# Patient Record
Sex: FEMALE | Race: BLACK OR AFRICAN AMERICAN
[De-identification: names, ages, dates, MRNs, and addresses within clinical notes are randomized per-mention and may not be internally consistent; named-entity substitution may affect disease eponyms.]

---

## 2017-02-11 ENCOUNTER — HOSPITAL ENCOUNTER (EMERGENCY)
Dept: HOSPITAL 17 - NEPE | Age: 42
LOS: 1 days | Discharge: HOME | End: 2017-02-12
Payer: MEDICAID

## 2017-02-11 VITALS
RESPIRATION RATE: 18 BRPM | OXYGEN SATURATION: 98 % | DIASTOLIC BLOOD PRESSURE: 68 MMHG | HEART RATE: 86 BPM | SYSTOLIC BLOOD PRESSURE: 113 MMHG

## 2017-02-11 VITALS
SYSTOLIC BLOOD PRESSURE: 107 MMHG | OXYGEN SATURATION: 99 % | HEART RATE: 87 BPM | TEMPERATURE: 98.4 F | RESPIRATION RATE: 16 BRPM | DIASTOLIC BLOOD PRESSURE: 63 MMHG

## 2017-02-11 VITALS
OXYGEN SATURATION: 97 % | SYSTOLIC BLOOD PRESSURE: 122 MMHG | RESPIRATION RATE: 20 BRPM | DIASTOLIC BLOOD PRESSURE: 74 MMHG | HEART RATE: 84 BPM

## 2017-02-11 VITALS — BODY MASS INDEX: 25.51 KG/M2 | HEIGHT: 66 IN | WEIGHT: 158.73 LBS

## 2017-02-11 DIAGNOSIS — G89.29: ICD-10-CM

## 2017-02-11 DIAGNOSIS — K21.9: ICD-10-CM

## 2017-02-11 DIAGNOSIS — M25.561: ICD-10-CM

## 2017-02-11 DIAGNOSIS — R30.0: Primary | ICD-10-CM

## 2017-02-11 DIAGNOSIS — I10: ICD-10-CM

## 2017-02-11 LAB
COLOR UR: YELLOW
COMMENT (UR): (no result)
CULTURE IF INDICATED: (no result)
GLUCOSE UR STRIP-MCNC: (no result) MG/DL
HGB UR QL STRIP: (no result)
KETONES UR STRIP-MCNC: (no result) MG/DL
MUCOUS THREADS #/AREA URNS LPF: (no result) /LPF
NITRITE UR QL STRIP: (no result)
SP GR UR STRIP: 1.02 (ref 1–1.03)
SQUAMOUS #/AREA URNS HPF: 2 /HPF (ref 0–5)

## 2017-02-11 PROCEDURE — 96372 THER/PROPH/DIAG INJ SC/IM: CPT

## 2017-02-11 PROCEDURE — 81001 URINALYSIS AUTO W/SCOPE: CPT

## 2017-02-11 PROCEDURE — 99284 EMERGENCY DEPT VISIT MOD MDM: CPT

## 2017-02-11 NOTE — PD
HPI


Chief Complaint:   Complaint


Time Seen by Provider:  20:28


Travel History


International Travel<30 days:  No


Contact w/Intl Traveler<30days:  No


Traveled to known affect area:  No





History of Present Illness


HPI


41-year-old female that presents to the ED for evaluation of burning with 

urination.  Per patient she's had dysuria and polyuria for the past couple 

days.  Per patient is getting worse today.  Per patient she's had some back 

pain with it as well.  Per patient she also has some chronic right knee pain 

secondary to having bad arthritis.  Per patient she has bone-on-bone.  Per 

patient nothing seems to make the pain better.  She denies any nausea or 

vomiting.  No chest pain or shortness of breath.  Per patient her pain on the 

urine is 8 out of 10.  She does have allergies to Lortab and tramadol.  Per 

patient she does tell me that she has an appointment with her orthopedic 

specialist in [place] sometime at the end of this month.  She tells me that 

she has no blood in the urine.  She denies any other medical problem at this 

time.  She does have a history of chronic anxiety.  She denies any pregnancy.  

Patient had her uterus and ovaries removed.  The pain does not radiate.  Pain 

on the knee gets worse with movement.  Per patient the pain in the knee is 6 

out of 10 but comes and goes. Denies any discharge.





PFSH


Past Medical History


ADHD:  Yes


Anxiety:  Yes


Depression:  Yes


Heart Rhythm Problems:  No


Cardiac Catheterization:  No


Cardiovascular Problems:  Yes (HTN)


High Cholesterol:  No


Congestive Heart Failure:  No


Diabetes:  No


Diminished Hearing:  No


Gastrointestinal Disorders:  Yes


GERD:  Yes


Hypertension:  Yes


Musculoskeletal:  Yes (CHRONIC BACK PAIN)


Neurologic:  Yes


Psychiatric:  Yes


Reproductive:  Yes


Immunizations Current:  Yes


Migraines:  Yes


Seizures:  Yes (hx)


Ulcer:  Yes


Pregnant?:  Not Pregnant


Menopausal:  Yes


:  4


Para:  3


Miscarriage:  1


:  0


Ovarian Cysts:  Yes


Dilation and Curettage (D&C):  Yes


Tubal Ligation:  Yes





Past Surgical History


Abdominal Surgery:  Yes (UMBILICAL HERNIA REPAIR)


Body Medical Devices:  UMBILICAL HERNIA


Coronary Artery Bypass Graft:  No


Gynecologic Surgery:  Yes


Hysterectomy:  Yes


Other Surgery:  Yes (umbukical hernia)





Social History


Alcohol Use:  Yes (socially)


Tobacco Use:  No


Substance Use:  No





Allergies-Medications


(Allergen,Severity, Reaction):  


Coded Allergies:  


     Lortab (Verified  Adverse Reaction, Intermediate, Nausea/Vomiting, 17)


     Tramadol (Verified  Adverse Reaction, Intermediate, Nausea/Vomiting, )


Reported Meds & Prescriptions





Reported Meds & Active Scripts


Active


Acetaminophen Extra Strength (Acetaminophen) 500 Mg Cap 1,000 Mg PO Q6H PRN


Xanax (Alprazolam) 0.5 Mg Tab 0.5 Mg PO Q8H PRN


Omeprazole 40 Mg Cap 40 Mg PO DAILY


Reported


Tizanidine (Tizanidine HCl) 4 Mg Tab 4 Mg PO TID


Mirtazapine 15 Mg Tab 15 Mg PO HS


Amlodipine (Amlodipine Besylate) 5 Mg Tab 5 Mg PO DAILY








Review of Systems


General / Constitutional:  No: Fever, Chills, Weight Gain, Weight Loss, Other


Eyes:  No: Diploplia, Blurred Vision, Photophobia, Drainage, Redness, Foreign 

Body Sensation, Pain, Tearing, Blind Spots, Visual changes, Blindness, Other


HENT:  No: Headaches, Vertigo, Lightheadedness, Sore Throat, Rhinitis, 

Rhinorrhea, Congestion, Nosebleed, Neck Stiffness, Neck Pain, Masses, Gingival 

Bleeding, Dental Difficulties, Ear Discharge, Earache, Other


Cardiovascular:  No: Chest Pain or Discomfort, Palpitations, Irregular Rhythm, 

Tachycardia, Diaphoresis, Syncope, Dyspnea on exertion, Varicosities, Edema, 

Cyanosis, Varicosities, Phlebitis, Claudication, Other


Respiratory:  No: Cough, Shortness of Breath, Wheezing, Sneezing, Orthopnea, 

Hemoptysis, Stridor, Night Sweats, Pleuritic Pain, Other


Gastrointestinal:  No: Nausea, Vomiting, Diarrhea, Abdominal Pain, Hematemesis, 

Hematochezia, Constipation, Changes in Bowel Habits, Indigestion, Dysphagia, 

Loss of Appetite, Other


Genitourinary:  Positive: Urgency, Frequency, Dysuria,  No: Nocturia, Hematuria

, Decreased Urinary Output, Oliguria, Hesitancy, Dribbling, Incontinence, 

Pelvic Pain, Flank Pain, Dyspareunia, Discharge, Dysmenorrhea, Menorrhagia, 

Metorrhagia, Vaginal Bleeding, Other


Musculoskeletal:  Positive: Pain,  No: Myalgias, Arthralgias, Limited ROM, 

Weakness, Cramping, Edema, Atrophy, Other


Skin:  No Rash, No Itching, No Dryness, No Lumps, No Hives, No Change in 

Pigmentation, No Change in nails, No Alopecia, No Lesions, No Breast Lumps, No 

Breast Tenderness, No Breast Swelling, No Other


Neurologic:  No: Weakness, Dizziness, Syncope, Focal Abnormalities, 

Coordination Problem, Tremor, Ataxia, Headache, Change in Mentation, Slurred 

Speech, Paresthesia, Incontinence, Seizures, Sensory Disturbance, Other


Psychiatric:  No: Anxiety, Depression, Suicidal Ideations, Disorder of Thought, 

Mood Disorder, Substance Abuse, Homicidal Ideation, Other


Endocrine:  No: Heat Intolerance, Cold Intolerance, Polyuria, Polydipsia, Other


Hematologic/Lymphatic:  No: Easy Bruising, Lymph Node Enlargement, Other





Physical Exam


Narrative


GENERAL: 


SKIN: Warm and dry.


HEAD: Atraumatic. Normocephalic. 


EYES: Pupils equal and round 4 mm reactive to light and accommodation. No 

scleral icterus. No injection or drainage. 


ENT: No nasal bleeding or discharge.  Mucous membranes pink and moist.  Tongue 

is midline.  No uvula deviation.


NECK: Trachea midline. No JVD. 


CARDIOVASCULAR: Regular rate and rhythm.  No murmurs, S3, S4.


RESPIRATORY: No accessory muscle use. Clear to auscultation. Breath sounds 

equal bilaterally. 


GASTROINTESTINAL: Abdomen soft, non-tender, nondistended. Hepatic and splenic 

margins not palpable. 


MUSCULOSKELETAL: Extremities without clubbing, cyanosis, or edema. No obvious 

deformities.  Full range of motion of the upper and lower extremities 

bilaterally.  2+ pulses bilaterally.  Patient has full range of motion of the 

right knee.  Patient is seen ambulating to the bathroom with no issues.  No 

obvious bony deformity noted.  No obvious soft tissue swelling noted.  

Ligaments appear to be intact.


NEUROLOGICAL: Awake and alert. No obvious cranial nerve deficits.  Motor 

grossly within normal limits. Five out of 5 muscle strength in the arms and 

legs.  Normal speech.


PSYCHIATRIC: Appropriate mood and affect; insight and judgment normal.





Data


Data


Last Documented VS





Vital Signs








  Date Time  Temp Pulse Resp B/P Pulse Ox O2 Delivery O2 Flow Rate FiO2


 


17 20:08  86 18 113/68 98 Room Air  


 


17 19:14 98.4       








Orders





 Urinalysis - C+S If Indicated (17 19:41)


Ketorolac Inj (Toradol Inj) (17 20:00)





Labs





 Laboratory Tests








Test 17





 20:30


 


Urine Color YELLOW 


 


Urine Turbidity CLEAR 


 


Urine pH 6.0 


 


Urine Specific Gravity 1.025 


 


Urine Protein TRACE mg/dL


 


Urine Glucose (UA) NEG mg/dL


 


Urine Ketones NEG mg/dL


 


Urine Occult Blood NEG 


 


Urine Nitrite NEG 


 


Urine Bilirubin NEG 


 


Urine Urobilinogen 2.0 MG/DL


 


Urine Leukocyte Esterase NEG 


 


Urine RBC 1 /hpf


 


Urine WBC LESS THAN 1





 /hpf


 


Urine Squamous Epithelial 2 /hpf





Cells 


 


Urine Mucus FEW /lpf


 


Microscopic Urinalysis Comment CULT NOT





 INDICATED











MDM


Medical Decision Making


Medical Screen Exam Complete:  Yes


Emergency Medical Condition:  Yes


Medical Record Reviewed:  Yes


Interpretation(s)


UA negative for acute disease


Differential Diagnosis


Chronic pain versus acute pain versus knee pain versus arthritis versus UTI 

versus cystitis versus pyelonephritis


Narrative Course


41-year-old female that presents to the ED for evaluation of right knee pain as 

well as UTI like symptoms.  Patient was properly examined and was found to have 

signs and symptoms consistent what appears to be UTI with chronic right knee 

pain.  Do not see any need for imaging of the knee as patient has no suffer any 

signs of trauma recently.  Urine will be done to check for UTI.  Patient agrees 

for this.  Patient was given Toradol for her pain.  Urine showed negative for 

acute disease.  Patient still complained of dysuria.  Patient likely having 

dysuria rebound see any sign of UTI.  She denies any vaginal discharge or signs 

of vaginitis or STD.  At this time I recommend trial of Pyridium and diclofenac 

sodium


For pain.  Patient was told to follow with PCP.  See ED worsening symptoms.  I 

do not recommend trial for this time as the urine was completely negative.





Diagnosis





 Primary Impression:  


 Chronic knee pain


 Qualified Code:  M25.561 - Chronic pain of right knee


 Additional Impression:  


 Dysuria


Patient Instructions:  General Instructions





***Additional Instructions:


Take medication as prescribed.  Follow with orthopedic surgeon.  See ED 

worsening symptoms. Drink plenty of fluids


***Med/Other Pt SpecificInfo:  Prescription(s) given


Scripts


Diclofenac Sodium DR 75 Mg Tabdr75 Mg PO BID PRN (PAIN SCALE 1 TO 10) #20 TAB


   Prov:Beto Palomares MD         17 


Phenazopyridine (Pyridium)200 Mg [id number] Mg PO Q8H PRN (DYSURIA) #20 TAB  Ref 0


   Prov:Beto Palomares MD         17


Disposition:  01 DISCHARGE HOME


Condition:  Stable








Mario Alberto Lamb 2017 20:33

## 2017-03-03 ENCOUNTER — HOSPITAL ENCOUNTER (EMERGENCY)
Dept: HOSPITAL 17 - NEPB | Age: 42
Discharge: HOME | End: 2017-03-03
Payer: MEDICAID

## 2017-03-03 VITALS — HEIGHT: 66 IN | BODY MASS INDEX: 26.57 KG/M2 | WEIGHT: 165.35 LBS

## 2017-03-03 VITALS
OXYGEN SATURATION: 100 % | DIASTOLIC BLOOD PRESSURE: 89 MMHG | RESPIRATION RATE: 24 BRPM | HEART RATE: 85 BPM | TEMPERATURE: 98.1 F | SYSTOLIC BLOOD PRESSURE: 156 MMHG

## 2017-03-03 DIAGNOSIS — I10: ICD-10-CM

## 2017-03-03 DIAGNOSIS — F41.8: ICD-10-CM

## 2017-03-03 DIAGNOSIS — H66.93: Primary | ICD-10-CM

## 2017-03-03 PROCEDURE — 96372 THER/PROPH/DIAG INJ SC/IM: CPT

## 2017-03-03 PROCEDURE — 99283 EMERGENCY DEPT VISIT LOW MDM: CPT

## 2017-03-03 NOTE — PD
HPI


Chief Complaint:  ENT Complaint


Time Seen by Provider:  18:45


Travel History


International Travel<30 days:  No


Contact w/Intl Traveler<30days:  No


Traveled to known affect area:  No





History of Present Illness


HPI


41-year-old Afro-American female presents the emergency with bilateral ear pain 

and upper respiratory infection symptoms as well as sore throat and neck 

discomfort on the right.  Patient denies difficulty swallowing.  She feels 

feverish and weak as well as nauseous.  Patient states this came on suddenly 

over the last 2 days.  Pain is roughly a 10 over 10.  Pain is worse in the 

right ear and throat.  She denies for shortness of breath.  She denies 

abdominal pain, vomiting, or diarrhea.  She is allergic to Lortab and tramadol.





PFSH


Past Medical History


ADHD:  Yes


Anxiety:  Yes


Depression:  Yes


Heart Rhythm Problems:  No


Cardiac Catheterization:  No


Cardiovascular Problems:  Yes (HTN)


High Cholesterol:  No


Congestive Heart Failure:  No


Diabetes:  No


Diminished Hearing:  No


Gastrointestinal Disorders:  Yes


GERD:  Yes


Hypertension:  Yes


Musculoskeletal:  Yes (CHRONIC BACK PAIN)


Neurologic:  Yes


Psychiatric:  Yes


Reproductive:  Yes


Immunizations Current:  Yes


Migraines:  Yes


Seizures:  Yes (hx)


Ulcer:  Yes


Menopausal:  Yes


:  4


Para:  3


Miscarriage:  1


:  0


Ovarian Cysts:  Yes


Dilation and Curettage (D&C):  Yes


Tubal Ligation:  Yes





Past Surgical History


Abdominal Surgery:  Yes (UMBILICAL HERNIA REPAIR)


Body Medical Devices:  UMBILICAL HERNIA


Coronary Artery Bypass Graft:  No


Gynecologic Surgery:  Yes


Hysterectomy:  Yes


Other Surgery:  Yes (umbukical hernia)





Social History


Alcohol Use:  Yes (socially)


Tobacco Use:  No


Substance Use:  No





Allergies-Medications


(Allergen,Severity, Reaction):  


Coded Allergies:  


     Lortab (Verified  Adverse Reaction, Intermediate, Nausea/Vomiting, 3/3/17)


     Tramadol (Verified  Adverse Reaction, Intermediate, Nausea/Vomiting, 3/3/17

)


Reported Meds & Prescriptions





Reported Meds & Active Scripts


Active


Percocet (Oxycodone-Acetaminophen) 5-325 mg Tab 1 Tab PO Q6H PRN


Ibuprofen 600 Mg Tab 600 Mg PO Q6H PRN


Cortisporin HC Otic Drops (Neomycin-Polymyxin-HC Otic Drops) 3.5-10,000-1 Mg-

Units-% Soln 4 Drop EACH EAR QID


Zofran (Ondansetron HCl) 4 Mg Tab 4 Mg PO Q6HR PRN


Levaquin (Levofloxacin) 500 Mg Tab 500 Mg PO DAILY


Diclofenac Sodium DR (Diclofenac Sodium) 75 Mg Tabdr 75 Mg PO BID PRN


Pyridium (Phenazopyridine HCl) 200 Mg Tab 200 Mg PO Q8H PRN


Acetaminophen Extra Strength (Acetaminophen) 500 Mg Cap 1,000 Mg PO Q6H PRN


Xanax (Alprazolam) 0.5 Mg Tab 0.5 Mg PO Q8H PRN


Omeprazole 40 Mg Cap 40 Mg PO DAILY


Reported


Tizanidine (Tizanidine HCl) 4 Mg Tab 4 Mg PO TID


Mirtazapine 15 Mg Tab 15 Mg PO HS


Amlodipine (Amlodipine Besylate) 5 Mg Tab 5 Mg PO DAILY








Review of Systems


Except as stated in HPI:  all other systems reviewed are Neg


General / Constitutional:  Positive: Chills,  No: Fever


Eyes:  No: Visual changes


HENT:  Positive: Headaches, Lightheadedness, Sore Throat, Rhinitis, Rhinorrhea, 

Congestion, Neck Pain, Earache (severe),  No: Neck Stiffness, Gingival Bleeding

, Dental Difficulties, Ear Discharge


Cardiovascular:  No: Chest Pain or Discomfort


Respiratory:  No: Shortness of Breath


Gastrointestinal:  No: Abdominal Pain


Genitourinary:  No: Dysuria


Musculoskeletal:  No: Pain


Skin:  No Rash


Neurologic:  No: Weakness


Psychiatric:  No: Depression


Endocrine:  No: Polydipsia


Hematologic/Lymphatic:  No: Easy Bruising





Physical Exam


Narrative


GENERAL: Patient is in moderate distress and tearful.  


SKIN: Warm and dry.  Normal color.  Normal turgor.  No rashes appreciated.


HEAD: Atraumatic. Normocephalic. 


EYES: Pupils equal and round. No scleral icterus. No injection or drainage. 


ENT: No nasal bleeding or discharge.  Mucous membranes pink and moist.  Pharynx 

appears unremarkable.  No significant tonsillitis or swelling.  Uvula is 

midline.  Patient has pain with movement of the right auricle.  Inspection 

shows the right ear canal to be erythematous with mild swelling with severe 

erythema, swelling and bulging of the right TM.  Left ear shows no pain with 

motion however ear canal appears erythematous and slightly swollen without 

drainage, and the left TM is dull red and bulging as well.


NECK: Trachea midline. No JVD.  Neck is tender with palpation along the right 

anterior cervical lymph nodes with no sign of Erik's angina.  


CARDIOVASCULAR: Regular rate and rhythm.  No murmurs appreciated this time.  


RESPIRATORY: No accessory muscle use. Clear to auscultation. Breath sounds 

equal bilaterally. 


MUSCULOSKELETAL: Extremities without clubbing, cyanosis, or edema. No obvious 

deformities. 


NEUROLOGICAL: Awake and alert. No obvious cranial nerve deficits.  Motor 

grossly within normal limits. Five out of 5 muscle strength in the arms and 

legs.  Normal speech.


PSYCHIATRIC: Appropriate mood and affect; insight and judgment normal.





Data


Data


Last Documented VS





Vital Signs








  Date Time  Temp Pulse Resp B/P Pulse Ox O2 Delivery O2 Flow Rate FiO2


 


3/3/17 18:08 98.1 85 24 156/89 100 Room Air  








Orders





 Levofloxacin (Levaquin) (3/3/17 18:45)


Ketorolac Inj (Toradol Inj) (3/3/17 18:45)


Oxycodone-Acetamin 5-325 Mg (Percocet (3/3/17 18:45)


Ondansetron  Odt (Zofran  Odt) (3/3/17 18:45)








MDM


Medical Decision Making


Medical Screen Exam Complete:  Yes


Emergency Medical Condition:  Yes


Differential Diagnosis


Upper restaurant infection.  Otalgia.  Otitis media.  Pharyngitis.


Narrative Course


Patient is in pain but medically stable at time of exam.


Patient is given 60 mg Toradol IM as well as Percocet 5/325 2 tabs by mouth now.


Patient is given Levaquin 750 mg by mouth now.


Patient is continued on Levaquin 500 mg once a day 10 days.


Patient is given ibuprofen 600 mg 4 times a day #40.


Patient is given Cortisporin Otic drops 4 drops in each ear 4 times a day.


Patient is given Zofran 4 mg one every 6 hours when necessary nausea or 

vomiting #12.


Patient is given Percocet 5/325 one tab every 6 hours when necessary pain #12.


Patient is follow with her primary care physician and perhaps be referred to 

her nose and throat specialist if symptoms continue.


Patient can return to emergency department if symptoms worsen as warranted.


Referrals:  


Ear / Nose / Throat Specialist





Primary Care Physician


Patient Instructions:  General Instructions, Otitis Media (ED)





***Additional Instructions:


Patient is given 60 mg Toradol IM as well as Percocet 5/325 2 tabs by mouth now.


Patient is given Levaquin 750 mg by mouth now.


Patient is continued on Levaquin 500 mg once a day 10 days.


Patient is given ibuprofen 600 mg 4 times a day #40.


Patient is given Cortisporin Otic drops 4 drops in each ear 4 times a day.


Patient is given Zofran 4 mg one every 6 hours when necessary nausea or 

vomiting #12.


Patient is given Percocet 5/325 one tab every 6 hours when necessary pain #12.


Patient is follow with her primary care physician and perhaps be referred to 

her nose and throat specialist if symptoms continue.


Patient can return to emergency department if symptoms worsen as warranted.


***Med/Other Pt SpecificInfo:  Prescription(s) given


Scripts


Oxycodone-Acetaminophen (Percocet)5-325 mg Tab1 Tab PO Q6H PRN (PAIN) #12 TAB  

Ref 0


   Prov:Neal Meyers MD         3/3/17 


Ibuprofen 600 Mg [id number] Mg PO Q6H PRN (Pain/Inflammation) #40 TAB


   Prov:Neal Meyers MD         3/3/17 


Neomycin-Polymyxin-HC Otic Drops (Cortisporin HC Otic Drops)3.5-10,000-1 Mg-

Units-% Soln4 Drop EACH EAR QID  #1 BOTTLE  Ref 0


   Prov:Neal Meyers MD         3/3/17 


Ondansetron (Zofran)4 Mg Tab4 Mg PO Q6HR PRN (NAUSEA OR VOMITING) #12 TAB


   Prov:Neal Meyers MD         3/3/17 


Levofloxacin (Levaquin)500 Mg [id number] Mg PO DAILY  #10 TAB  Ref 0


   Prov:Neal Meyers MD         3/3/17


Disposition:  01 DISCHARGE HOME


Condition:  Stable








Damon Francisco Mar 3, 2017 18:53

## 2017-04-05 ENCOUNTER — HOSPITAL ENCOUNTER (EMERGENCY)
Dept: HOSPITAL 17 - NEPD | Age: 42
Discharge: HOME | End: 2017-04-05
Payer: MEDICAID

## 2017-04-05 VITALS
RESPIRATION RATE: 18 BRPM | DIASTOLIC BLOOD PRESSURE: 70 MMHG | HEART RATE: 89 BPM | OXYGEN SATURATION: 100 % | SYSTOLIC BLOOD PRESSURE: 129 MMHG

## 2017-04-05 VITALS
OXYGEN SATURATION: 100 % | RESPIRATION RATE: 20 BRPM | SYSTOLIC BLOOD PRESSURE: 119 MMHG | TEMPERATURE: 98.7 F | HEART RATE: 86 BPM | DIASTOLIC BLOOD PRESSURE: 77 MMHG

## 2017-04-05 VITALS — HEIGHT: 67 IN | WEIGHT: 158.73 LBS | BODY MASS INDEX: 24.91 KG/M2

## 2017-04-05 VITALS — RESPIRATION RATE: 18 BRPM | OXYGEN SATURATION: 98 %

## 2017-04-05 DIAGNOSIS — I10: ICD-10-CM

## 2017-04-05 DIAGNOSIS — G43.909: ICD-10-CM

## 2017-04-05 DIAGNOSIS — F41.9: Primary | ICD-10-CM

## 2017-04-05 DIAGNOSIS — Z79.899: ICD-10-CM

## 2017-04-05 LAB
ANION GAP SERPL CALC-SCNC: 5 MEQ/L (ref 5–15)
BASOPHILS # BLD AUTO: 0 TH/MM3 (ref 0–0.2)
BASOPHILS NFR BLD: 0.5 % (ref 0–2)
BUN SERPL-MCNC: 11 MG/DL (ref 7–18)
CHLORIDE SERPL-SCNC: 103 MEQ/L (ref 98–107)
CK MB SERPL-MCNC: 0.8 NG/ML (ref 0.5–3.6)
CK SERPL-CCNC: 124 U/L (ref 26–192)
EOSINOPHIL # BLD: 0 TH/MM3 (ref 0–0.4)
EOSINOPHIL NFR BLD: 1 % (ref 0–4)
ERYTHROCYTE [DISTWIDTH] IN BLOOD BY AUTOMATED COUNT: 12.9 % (ref 11.6–17.2)
GFR SERPLBLD BASED ON 1.73 SQ M-ARVRAT: 99 ML/MIN (ref 89–?)
HCO3 BLD-SCNC: 32.9 MEQ/L (ref 21–32)
HCT VFR BLD CALC: 39.8 % (ref 35–46)
HEMO FLAGS: (no result)
LYMPHOCYTES # BLD AUTO: 1 TH/MM3 (ref 1–4.8)
LYMPHOCYTES NFR BLD AUTO: 23.3 % (ref 9–44)
MAGNESIUM SERPL-MCNC: 2 MG/DL (ref 1.5–2.5)
MCH RBC QN AUTO: 29.8 PG (ref 27–34)
MCHC RBC AUTO-ENTMCNC: 32.3 % (ref 32–36)
MCV RBC AUTO: 92.3 FL (ref 80–100)
MONOCYTES NFR BLD: 7.9 % (ref 0–8)
NEUTROPHILS # BLD AUTO: 3 TH/MM3 (ref 1.8–7.7)
NEUTROPHILS NFR BLD AUTO: 67.3 % (ref 16–70)
PLATELET # BLD: 199 TH/MM3 (ref 150–450)
POTASSIUM SERPL-SCNC: 4 MEQ/L (ref 3.5–5.1)
RBC # BLD AUTO: 4.31 MIL/MM3 (ref 4–5.3)
SODIUM SERPL-SCNC: 141 MEQ/L (ref 136–145)
WBC # BLD AUTO: 4.4 TH/MM3 (ref 4–11)

## 2017-04-05 PROCEDURE — 82550 ASSAY OF CK (CPK): CPT

## 2017-04-05 PROCEDURE — 80048 BASIC METABOLIC PNL TOTAL CA: CPT

## 2017-04-05 PROCEDURE — 83735 ASSAY OF MAGNESIUM: CPT

## 2017-04-05 PROCEDURE — 70450 CT HEAD/BRAIN W/O DYE: CPT

## 2017-04-05 PROCEDURE — 71010: CPT

## 2017-04-05 PROCEDURE — 84484 ASSAY OF TROPONIN QUANT: CPT

## 2017-04-05 PROCEDURE — 96374 THER/PROPH/DIAG INJ IV PUSH: CPT

## 2017-04-05 PROCEDURE — 99285 EMERGENCY DEPT VISIT HI MDM: CPT

## 2017-04-05 PROCEDURE — 82552 ASSAY OF CPK IN BLOOD: CPT

## 2017-04-05 PROCEDURE — 93005 ELECTROCARDIOGRAM TRACING: CPT

## 2017-04-05 PROCEDURE — 85025 COMPLETE CBC W/AUTO DIFF WBC: CPT

## 2017-04-05 PROCEDURE — 96375 TX/PRO/DX INJ NEW DRUG ADDON: CPT

## 2017-04-05 NOTE — PD
HPI


Chief Complaint:  Headache


Time Seen by Provider:  11:15


Travel History


International Travel<30 days:  No


Contact w/Intl Traveler<30days:  No


Traveled to known affect area:  No





History of Present Illness


HPI


41-year-old female with a past medical history of hypertension, bipolar disorder

, migraine headaches presents to the emergency department for evaluation of 

headache, anxiety, chest pain.  Patient states that she started with a migraine 

headache on Monday.  However, she then started getting some sharp stabbing 

pains to her bilateral sides of her head which is different for her.  She 

states she took 2 Excedrin on Monday and 2 Excedrin this morning without relief 

of the headache.  It has started and gradually has worsened.  Patient currently 

rates the pain 10/10.  Patient states she is nauseous but denies any vomiting.  

She does report anxiety with chest pain.  She states this started on the way to 

the hospital.  She reports being under a lot of stress at home with an older 

child who will not carry his weight.  The patient states that she has a history 

of anxiety with chest pain.  She states this is her typical symptoms.  She 

states that the last time she had the same symptoms for last week.  She denies 

any new symptoms with her anxiety.





PFSH


Past Medical History


ADHD:  Yes


Anxiety:  Yes


Depression:  Yes


Heart Rhythm Problems:  No


Cardiac Catheterization:  No


Cardiovascular Problems:  Yes (HTN)


High Cholesterol:  No


Congestive Heart Failure:  No


Diabetes:  No


Diminished Hearing:  No


Gastrointestinal Disorders:  Yes


GERD:  Yes


Heparin Induced Thrombocytopen:  No


Hypertension:  Yes


Musculoskeletal:  Yes (CHRONIC BACK PAIN)


Neurologic:  Yes


Psychiatric:  Yes


Reproductive:  Yes


Immunizations Current:  Yes


Migraines:  Yes


Seizures:  Yes (hx)


Ulcer:  Yes


Pregnant?:  Not Pregnant


Menopausal:  Yes


:  4


Para:  3


Miscarriage:  1


:  0


Ovarian Cysts:  Yes


Dilation and Curettage (D&C):  Yes


Tubal Ligation:  Yes





Past Surgical History


Abdominal Surgery:  Yes (UMBILICAL HERNIA REPAIR)


Body Medical Devices:  UMBILICAL HERNIA


Coronary Artery Bypass Graft:  No


Gynecologic Surgery:  Yes


Hysterectomy:  Yes


Other Surgery:  Yes (umbukical hernia <WOW)





Family History


Family Myocardial Infarction:  No





Social History


Alcohol Use:  Yes (socially)


Tobacco Use:  No


Substance Use:  No





Allergies-Medications


(Allergen,Severity, Reaction):  


Coded Allergies:  


     Lortab (Verified  Adverse Reaction, Intermediate, Nausea/Vomiting, 3/3/17)


     Tramadol (Verified  Adverse Reaction, Intermediate, Nausea/Vomiting, 3/3/17

)


Reported Meds & Prescriptions





Reported Meds & Active Scripts


Active


Percocet (Oxycodone-Acetaminophen) 5-325 mg Tab 1 Tab PO Q6H PRN


Ibuprofen 600 Mg Tab 600 Mg PO Q6H PRN


Cortisporin HC Otic Drops (Neomycin-Polymyxin-HC Otic Drops) 3.5-10,000-1 Mg-

Units-% Soln 4 Drop EACH EAR QID


Zofran (Ondansetron HCl) 4 Mg Tab 4 Mg PO Q6HR PRN


Levaquin (Levofloxacin) 500 Mg Tab 500 Mg PO DAILY


Diclofenac Sodium DR (Diclofenac Sodium) 75 Mg Tabdr 75 Mg PO BID PRN


Pyridium (Phenazopyridine HCl) 200 Mg Tab 200 Mg PO Q8H PRN


Acetaminophen Extra Strength (Acetaminophen) 500 Mg Cap 1,000 Mg PO Q6H PRN


Xanax (Alprazolam) 0.5 Mg Tab 0.5 Mg PO Q8H PRN


Omeprazole 40 Mg Cap 40 Mg PO DAILY


Reported


Tizanidine (Tizanidine HCl) 4 Mg Tab 4 Mg PO TID


Mirtazapine 15 Mg Tab 15 Mg PO HS


Amlodipine (Amlodipine Besylate) 5 Mg Tab 5 Mg PO DAILY








Review of Systems


Except as stated in HPI:  all other systems reviewed are Neg





Physical Exam


Narrative


GENERAL: Well-nourished, well-developed female patient, afebrile.  Vital signs 

stable.


SKIN: Focused skin assessment warm/dry.


HEAD: Normocephalic.  Atraumatic.


EYES: No scleral icterus. No injection or drainage.  PERRLA.  EOM intact.


ENT: Mucosa pink and moist. No erythema or exudates. No uvular edema. No uvular

, palatal, or tonsillar deviation. Airway patent. Nasal turbinates appear 

normal without nasal blood, purulent drainage or septal hematoma.  Bilateral 

tympanic membranes are clear without erythema or perforation.


NECK: Supple, trachea midline. No JVD or lymphadenopathy.


CARDIOVASCULAR: Regular rate and rhythm without murmurs, gallops, or rubs. 


RESPIRATORY: Breath sounds equal bilaterally. No accessory muscle use.  Lungs 

sounds are clear to auscultation.


GASTROINTESTINAL: Abdomen soft, non-tender, nondistended. 


MUSCULOSKELETAL: No cyanosis, or edema.  Chest wall pain is easily reproducible 

with palpation.


BACK: Nontender without obvious deformity. No CVA tenderness. 


NEUROLOGICAL: Awake and alert. Cranial nerves II through XII intact. Motor and 

sensory grossly within normal limits. Five out of 5 muscle strength in all 

muscle groups. Normal speech.





Data


Data


Last Documented VS





Vital Signs








  Date Time  Temp Pulse Resp B/P Pulse Ox O2 Delivery O2 Flow Rate FiO2


 


17 11:36  89 18 129/70 100 Room Air  


 


17 10:09 98.7       








Orders





 Complete Blood Count With Diff (17 11:13)


Basic Metabolic Panel (Bmp) (17 11:13)


Ct Brain W/O Iv Contrast(Rout) (17 11:13)


Ecg Monitoring (17 11:13)


Iv Access Insert/Monitor (17 11:13)


Oximetry (17 11:13)


Sodium Chloride 0.9% Flush (Ns Flush) (17 11:15)


Prochlorperazine Inj (Compazine Inj) (17 11:15)


Diphenhydramine Inj (Benadryl Inj) (17 11:15)


Sodium Chlor 0.9% 1000 Ml Inj (Ns 1000 M (17 11:13)


Ckmb (Isoenzyme) Profile (17 11:13)


Magnesium (Mg) (17 11:13)


Troponin I (17 11:13)


Chest, Single Ap (17 11:13)


Electrocardiogram (17 10:51)


CKMB (17 11:27)


CKMB% (17 11:27)





Labs





 Laboratory Tests








Test 17





 11:27


 


White Blood Count 4.4 TH/MM3


 


Red Blood Count 4.31 MIL/MM3


 


Hemoglobin 12.8 GM/DL


 


Hematocrit 39.8 %


 


Mean Corpuscular Volume 92.3 FL


 


Mean Corpuscular Hemoglobin 29.8 PG


 


Mean Corpuscular Hemoglobin 32.3 %





Concent 


 


Red Cell Distribution Width 12.9 %


 


Platelet Count 199 TH/MM3


 


Mean Platelet Volume 7.4 FL


 


Neutrophils (%) (Auto) 67.3 %


 


Lymphocytes (%) (Auto) 23.3 %


 


Monocytes (%) (Auto) 7.9 %


 


Eosinophils (%) (Auto) 1.0 %


 


Basophils (%) (Auto) 0.5 %


 


Neutrophils # (Auto) 3.0 TH/MM3


 


Lymphocytes # (Auto) 1.0 TH/MM3


 


Monocytes # (Auto) 0.4 TH/MM3


 


Eosinophils # (Auto) 0.0 TH/MM3


 


Basophils # (Auto) 0.0 TH/MM3


 


CBC Comment DIFF FINAL 


 


Differential Comment  


 


Sodium Level 141 MEQ/L


 


Potassium Level 4.0 MEQ/L


 


Chloride Level 103 MEQ/L


 


Carbon Dioxide Level 32.9 MEQ/L


 


Anion Gap 5 MEQ/L


 


Blood Urea Nitrogen 11 MG/DL


 


Creatinine 0.77 MG/DL


 


Estimat Glomerular Filtration 99 ML/MIN





Rate 


 


Random Glucose 65 MG/DL


 


Calcium Level 9.4 MG/DL


 


Magnesium Level 2.0 MG/DL


 


Total Creatine Kinase 124 U/L


 


Creatine Kinase MB 0.8 NG/ML


 


Troponin I LESS THAN 0.02





 NG/ML











MDM


Medical Decision Making


Medical Screen Exam Complete:  Yes


Emergency Medical Condition:  Yes


Medical Record Reviewed:  Yes


Interpretation(s)





Last Impressions








Head CT 17 1113 Signed





Impressions: 





 Service Date/Time:  2017 12:43 - CONCLUSION: Negative for 





 acute process.    Dilshad Garcia MD  FACR


 


Chest X-Ray 17 1113 Signed





Impressions: 





 Service Date/Time:  2017 11:18 - CONCLUSION:  1. Normal 





 examination.     Joshua Garcia MD 








Last Impressions








Chest X-Ray 17 1113 Signed





Impressions: 





 Service Date/Time:  2017 11:18 - CONCLUSION:  1. Normal 





 examination.     Joshua Garcia MD 








Differential Diagnosis


Migraine headache versus tension type headache versus cardiac abnormality 

versus anxiety versus unlikely ACS


Narrative Course


41-year-old female presents to the emergency department for evaluation of 

headache and anxiety with chest pain.  Patient does state this headache is 

different from her typical migraine headaches.  However, chest pain with 

anxiety is her typical symptoms.  She had exact same symptoms last week as 

well.  Patient does have history of hypertension, migraine headaches, bipolar 

disorder.  EKG shows sinus rhythm, heart rate 76, no acute ST changes.  CT of 

the brain is ordered and pending.  CBC, BMP, CK, troponin, magnesium, chest x-

ray ordered and pending.  IV access established.  Patient is given normal 

saline 1 L IV bolus, Compazine 10 mg IV, Benadryl 50 mg IV.





CBC is unremarkable.  BMP shows no acute abnormality.  CK is 124.  Troponin is 

less than 0.02.  Magnesium is 2.0.  Chest x-ray is normal.  CT of the brain is 

negative for acute process.  Patient is given Toradol 30 mg IV.  I discussed 

the case with my attending physician, Dr. Meyers, who agrees with plan and 

disposition.  Patient is instructed to follow-up with her primary care 

physician.  She verbalizes agreement and understanding.





Diagnosis





 Primary Impression:  


 Anxiety


 Additional Impression:  


 Migraine headache


 Qualified Code:  G43.909 - Migraine without status migrainosus, not intractable

, unspecified migraine type


Referrals:  


Primary Care Physician


call for appointment


Patient Instructions:  Anxiety (ED), General Instructions, Migraine Headache (ED

)


Departure Forms:  Tests/Procedures, Work Release   Enter return to work date:  

2017





***Additional Instructions:


Take naproxen as instructed as needed with food for pain.


Follow-up with your primary care physician.


Return to the emergency department for any acute worsening of symptoms.


***Med/Other Pt SpecificInfo:  Prescription(s) given


Scripts


Naproxen 500 Mg [id number] Mg PO BID PRN (PAIN SCALE 1 TO 10) #20 TAB  Ref 0


   Prov:Brii Horn         17


Disposition:  01 DISCHARGE HOME


Condition:  Stable








Brii Horn 2017 11:20

## 2017-04-05 NOTE — RADRPT
EXAM DATE/TIME:  04/05/2017 11:18 

 

[facility] COMPARISON:     

CHEST SINGLE AP, October 31, 2016, 3:22.

 

                     

INDICATIONS :     

Chest pain.

                     

 

MEDICAL HISTORY :            

anxiety   

 

SURGICAL HISTORY :     

None.   

 

ENCOUNTER:     

Initial                                        

 

ACUITY:     

1 day      

 

PAIN SCORE:     

7/10

 

LOCATION:     

Bilateral chest 

 

FINDINGS:     

A single view of the chest demonstrates the lungs to be symmetrically aerated without evidence of mas
s, infiltrate or effusion.  The cardiomediastinal contours are unremarkable.  Osseous structures are 
intact.

 

CONCLUSION:     

1. Normal examination.

 

 

 

 Joshua Garcia MD on April 05, 2017 at 11:35           

Board Certified Radiologist.

 This report was verified electronically.

## 2017-04-05 NOTE — RADRPT
EXAM DATE/TIME:  04/05/2017 12:43 

 

[facility] COMPARISON:     CT BRAIN W/O CONTRAST, November 21, 2016, 17:16.

 

INDICATIONS :     Cephalgia. 

                      

RADIATION DOSE:     56.35 CTDIvol (mGy) 

 

 

MEDICAL HISTORY :     Hypertension. Cardiovascular disease Migraines

SURGICAL HISTORY :      Hysterectomy. 

ENCOUNTER:      Initial

ACUITY:      3 days

PAIN SCALE:      5/10

LOCATION:       Bilateral cranial 

 

TECHNIQUE:     Multiple contiguous axial images were obtained of the head.  Using automated exposure 
control and adjustment of the mA and/or kV according to patient size, radiation dose was kept as low 
as reasonably achievable to obtain optimal diagnostic quality images. 

 

FINDINGS:     

CEREBRUM:     The ventricles are normal for age.  No evidence of midline shift, mass lesion, hemorrha
ge or acute infarction.  No extra-axial fluid collections are seen.

POSTERIOR FOSSA:     The cerebellum and brainstem are intact.  The 4th ventricle is midline.  The cer
ebellopontine angle is unremarkable.

EXTRACRANIAL:     The visualized portion of the orbits is intact.

SKULL:     The calvaria is intact.  No evidence of skull fracture.

 

CONCLUSION:     Negative for acute process.  

 Dilshad Garcia MD FACR on April 05, 2017 at 13:05           

Board Certified Radiologist.

 This report was verified electronically.

## 2017-04-06 NOTE — EKG
Date Performed: 04/05/2017       Time Performed: 10:51:00

 

PTAGE:      41 years

 

EKG:      Sinus rhythm 

 

 NORMAL ECG INTERPRETATION BASED ON A DEFAULT AGE OF 38 YEARS 

 

 PREVIOUS TRACING            : 11/21/2016 17.42 Compared to prior tracing no significant change

 

DOCTOR:   Anders Sanchez  Interpretating Date/Time  04/06/2017 13:34:32

## 2017-05-26 ENCOUNTER — HOSPITAL ENCOUNTER (EMERGENCY)
Dept: HOSPITAL 17 - NEPD | Age: 42
Discharge: HOME | End: 2017-05-26
Payer: MEDICAID

## 2017-05-26 VITALS
HEART RATE: 86 BPM | SYSTOLIC BLOOD PRESSURE: 124 MMHG | RESPIRATION RATE: 14 BRPM | DIASTOLIC BLOOD PRESSURE: 71 MMHG | TEMPERATURE: 98.2 F | OXYGEN SATURATION: 100 %

## 2017-05-26 DIAGNOSIS — G89.29: ICD-10-CM

## 2017-05-26 DIAGNOSIS — M54.5: Primary | ICD-10-CM

## 2017-05-26 PROCEDURE — 99284 EMERGENCY DEPT VISIT MOD MDM: CPT

## 2017-05-26 PROCEDURE — 96372 THER/PROPH/DIAG INJ SC/IM: CPT
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
## 2017-09-24 ENCOUNTER — HOSPITAL ENCOUNTER (EMERGENCY)
Dept: HOSPITAL 17 - NEPD | Age: 42
Discharge: HOME | End: 2017-09-24
Payer: MEDICAID

## 2017-09-24 VITALS — OXYGEN SATURATION: 96 %

## 2017-09-24 VITALS
DIASTOLIC BLOOD PRESSURE: 84 MMHG | RESPIRATION RATE: 18 BRPM | HEART RATE: 94 BPM | OXYGEN SATURATION: 100 % | SYSTOLIC BLOOD PRESSURE: 127 MMHG

## 2017-09-24 VITALS — WEIGHT: 170.31 LBS | BODY MASS INDEX: 26.73 KG/M2 | HEIGHT: 67 IN

## 2017-09-24 DIAGNOSIS — R53.1: ICD-10-CM

## 2017-09-24 DIAGNOSIS — I10: ICD-10-CM

## 2017-09-24 DIAGNOSIS — K21.9: ICD-10-CM

## 2017-09-24 DIAGNOSIS — R42: Primary | ICD-10-CM
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
## 2017-09-24 DIAGNOSIS — R51: ICD-10-CM

## 2017-09-24 DIAGNOSIS — R10.9: ICD-10-CM
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
## 2017-09-24 LAB
ANION GAP SERPL CALC-SCNC: 5 MEQ/L (ref 5–15)
BASOPHILS # BLD AUTO: 0 TH/MM3 (ref 0–0.2)
BASOPHILS NFR BLD: 0.5 % (ref 0–2)
BUN SERPL-MCNC: 8 MG/DL (ref 7–18)
CHLORIDE SERPL-SCNC: 107 MEQ/L (ref 98–107)
COLOR UR: COLORLESS
COMMENT (UR): (no result)
CULTURE IF INDICATED: (no result)
EOSINOPHIL # BLD: 0 TH/MM3 (ref 0–0.4)
EOSINOPHIL NFR BLD: 1.2 % (ref 0–4)
ERYTHROCYTE [DISTWIDTH] IN BLOOD BY AUTOMATED COUNT: 13.5 % (ref 11.6–17.2)
GFR SERPLBLD BASED ON 1.73 SQ M-ARVRAT: 129 ML/MIN (ref 89–?)
GLUCOSE UR STRIP-MCNC: (no result) MG/DL
HCO3 BLD-SCNC: 26 MEQ/L (ref 21–32)
HCT VFR BLD CALC: 36.1 % (ref 35–46)
HEMO FLAGS: (no result)
HGB UR QL STRIP: (no result)
KETONES UR STRIP-MCNC: (no result) MG/DL
LYMPHOCYTES # BLD AUTO: 0.8 TH/MM3 (ref 1–4.8)
LYMPHOCYTES NFR BLD AUTO: 22.5 % (ref 9–44)
MCH RBC QN AUTO: 31 PG (ref 27–34)
MCHC RBC AUTO-ENTMCNC: 33.4 % (ref 32–36)
MCV RBC AUTO: 92.6 FL (ref 80–100)
MONOCYTES NFR BLD: 17.7 % (ref 0–8)
NEUTROPHILS # BLD AUTO: 2 TH/MM3 (ref 1.8–7.7)
NEUTROPHILS NFR BLD AUTO: 58.1 % (ref 16–70)
NITRITE UR QL STRIP: (no result)
PLATELET # BLD: 160 TH/MM3 (ref 150–450)
POTASSIUM SERPL-SCNC: 4.8 MEQ/L (ref 3.5–5.1)
RBC # BLD AUTO: 3.89 MIL/MM3 (ref 4–5.3)
SODIUM SERPL-SCNC: 138 MEQ/L (ref 136–145)
SP GR UR STRIP: 1 (ref 1–1.03)
SQUAMOUS #/AREA URNS HPF: 2 /HPF (ref 0–5)
WBC # BLD AUTO: 3.4 TH/MM3 (ref 4–11)

## 2017-09-24 PROCEDURE — 99284 EMERGENCY DEPT VISIT MOD MDM: CPT

## 2017-09-24 PROCEDURE — 85025 COMPLETE CBC W/AUTO DIFF WBC: CPT

## 2017-09-24 PROCEDURE — 80048 BASIC METABOLIC PNL TOTAL CA: CPT

## 2017-09-24 PROCEDURE — 81001 URINALYSIS AUTO W/SCOPE: CPT

## 2017-09-24 PROCEDURE — 96361 HYDRATE IV INFUSION ADD-ON: CPT

## 2017-09-24 PROCEDURE — 96374 THER/PROPH/DIAG INJ IV PUSH: CPT

## 2017-09-24 NOTE — PD
HPI


Chief Complaint:  General Weakness


Time Seen by Provider:  16:16


Travel History


International Travel<30 days:  No


Contact w/Intl Traveler<30days:  No


Traveled to known affect area:  No





History of Present Illness


HPI


The patient is 42 years old.  She complains of subjective fever, diarrhea, 

headache, face pain, chest and abdominal pain, dizziness.  She had a fall 

earlier today.  She describes generalized weakness all over.  She complains of 

GERD.  She reports some dizziness consistent with vertigo which in the past had 

been successfully managed with meclizine.  Duration about 2 days.  Location 

generalized.  Severity moderate.





PFSH


Past Medical History


ADHD:  Yes


Anxiety:  Yes


Depression:  Yes


Heart Rhythm Problems:  No


Cardiac Catheterization:  No


Cardiovascular Problems:  Yes


High Cholesterol:  No


Congestive Heart Failure:  No


Diabetes:  No


Diminished Hearing:  No


Gastrointestinal Disorders:  Yes


GERD:  Yes


Heparin Induced Thrombocytopen:  No


Hypertension:  Yes


Musculoskeletal:  Yes (CHRONIC BACK PAIN)


Neurologic:  Yes


Psychiatric:  Yes


Reproductive:  Yes


Immunizations Current:  Yes


Migraines:  Yes


Seizures:  Yes (hx)


Ulcer:  Yes


Pregnant?:  Not Pregnant


Menopausal:  Yes


:  4


Para:  3


Miscarriage:  1


:  0


Ovarian Cysts:  Yes


Dilation and Curettage (D&C):  Yes


Tubal Ligation:  Yes





Past Surgical History


Abdominal Surgery:  Yes (UMBILICAL HERNIA REPAIR)


Body Medical Devices:  UMBILICAL HERNIA


Coronary Artery Bypass Graft:  No


Gynecologic Surgery:  Yes


Hysterectomy:  Yes


Other Surgery:  Yes (umbukical hernia <WOW)





Family History


Family Myocardial Infarction:  No





Social History


Alcohol Use:  No


Tobacco Use:  No


Substance Use:  No





Allergies-Medications


(Allergen,Severity, Reaction):  


Coded Allergies:  


     acetaminophen (Unverified  Adverse Reaction, Intermediate, Nausea/Vomiting

, 8/15/17)


     hydrocodone (Unverified  Adverse Reaction, Intermediate, Nausea/Vomiting, 8

/15/17)


     tramadol (Unverified  Adverse Reaction, Intermediate, Nausea/Vomiting, 8/15

/17)


Reported Meds & Prescriptions





Reported Meds & Active Scripts


Active


Meclizine (Meclizine HCl) 25 Mg Tab 25 Mg PO AS DIRECTED PRN


Ondansetron Odt 4 Mg Tab 4 Mg SL Q6HR PRN


Percocet (Oxycodone-Acetaminophen) 5-325 mg Tab 1 Tab PO Q6H PRN


Zofran (Ondansetron HCl) 4 Mg Tab 4 Mg PO Q6HR PRN


Xanax (Alprazolam) 0.5 Mg Tab 0.5 Mg PO Q8H PRN


Reported


Tizanidine (Tizanidine HCl) 4 Mg Tab 4 Mg PO TID


Mirtazapine 15 Mg Tab 15 Mg PO HS


Amlodipine (Amlodipine Besylate) 5 Mg Tab 5 Mg PO DAILY








Review of Systems


Except as stated in HPI:  all other systems reviewed are Neg





Physical Exam


Narrative


GENERAL: 42-year-old female well-nourished well-developed no acute distress


SKIN: Warm and dry.


HEAD: Atraumatic. Normocephalic. 


EYES: Pupils equal and round. No scleral icterus. No injection or drainage. 


ENT: No nasal bleeding or discharge.  Mucous membranes pink and moist.


NECK: Trachea midline. No JVD. 


CARDIOVASCULAR: Regular rate and rhythm.  


RESPIRATORY: No accessory muscle use. Clear to auscultation. Breath sounds 

equal bilaterally. 


GASTROINTESTINAL: Abdomen soft, non-tender, nondistended. Hepatic and splenic 

margins not palpable. 


MUSCULOSKELETAL: Extremities without clubbing, cyanosis, or edema. No obvious 

deformities. 


NEUROLOGICAL: Awake and alert. No obvious cranial nerve deficits.  Motor 

grossly within normal limits. Five out of 5 muscle strength in the arms and 

legs.  Normal speech.


PSYCHIATRIC: Appropriate mood and affect; insight and judgment normal.





Data


Data


Last Documented VS





Vital Signs








  Date Time  Temp Pulse Resp B/P (MAP) Pulse Ox O2 Delivery O2 Flow Rate FiO2


 


17 17:05     96  2.00 


 


17 16:21   18   Room Air  


 


17 14:58  94      








Orders





 Orders


Urinalysis - C+S If Indicated (17 15:24)


Basic Metabolic Panel (Bmp) (17 17:00)


Complete Blood Count With Diff (17 17:00)


Iv Access Insert/Monitor (17 17:00)


Ecg Monitoring (17 17:00)


Oximetry (17 17:00)


Ondansetron Inj (Zofran Inj) (17 17:00)


Sodium Chlor 0.9% 1000 Ml Inj (Ns 1000 M (17 17:00)


Sodium Chloride 0.9% Flush (Ns Flush) (17 17:00)


Sodium Chlor 0.9% 1000 Ml Inj (Ns 1000 M (17 17:00)


Meclizine (Antivert) (17 17:45)





Labs





Laboratory Tests








Test


  17


15:26 17


17:41


 


Urine Color COLORLESS  


 


Urine Turbidity CLEAR  


 


Urine pH 7.0  


 


Urine Specific Gravity 1.005  


 


Urine Protein NEG mg/dL  


 


Urine Glucose (UA) NEG mg/dL  


 


Urine Ketones NEG mg/dL  


 


Urine Occult Blood NEG  


 


Urine Nitrite NEG  


 


Urine Bilirubin NEG  


 


Urine Urobilinogen


  LESS THAN 2.0


MG/DL 


 


 


Urine Leukocyte Esterase NEG  


 


Urine RBC 1 /hpf  


 


Urine WBC


  LESS THAN 1


/hpf 


 


 


Urine Squamous Epithelial


Cells 2 /hpf 


  


 


 


Microscopic Urinalysis Comment


  CULT NOT


INDICATED 


 


 


White Blood Count  3.4 TH/MM3 


 


Red Blood Count  3.89 MIL/MM3 


 


Hemoglobin  12.1 GM/DL 


 


Hematocrit  36.1 % 


 


Mean Corpuscular Volume  92.6 FL 


 


Mean Corpuscular Hemoglobin  31.0 PG 


 


Mean Corpuscular Hemoglobin


Concent 


  33.4 % 


 


 


Red Cell Distribution Width  13.5 % 


 


Platelet Count  160 TH/MM3 


 


Mean Platelet Volume  7.3 FL 


 


Neutrophils (%) (Auto)  58.1 % 


 


Lymphocytes (%) (Auto)  22.5 % 


 


Monocytes (%) (Auto)  17.7 % 


 


Eosinophils (%) (Auto)  1.2 % 


 


Basophils (%) (Auto)  0.5 % 


 


Neutrophils # (Auto)  2.0 TH/MM3 


 


Lymphocytes # (Auto)  0.8 TH/MM3 


 


Monocytes # (Auto)  0.6 TH/MM3 


 


Eosinophils # (Auto)  0.0 TH/MM3 


 


Basophils # (Auto)  0.0 TH/MM3 


 


CBC Comment  DIFF FINAL 


 


Differential Comment   


 


Blood Urea Nitrogen  8 MG/DL 


 


Creatinine  0.61 MG/DL 


 


Random Glucose  88 MG/DL 


 


Calcium Level  8.5 MG/DL 


 


Sodium Level  138 MEQ/L 


 


Potassium Level  4.8 MEQ/L 


 


Chloride Level  107 MEQ/L 


 


Carbon Dioxide Level  26.0 MEQ/L 


 


Anion Gap  5 MEQ/L 


 


Estimat Glomerular Filtration


Rate 


  129 ML/MIN 


 











MDM


Medical Decision Making


Medical Screen Exam Complete:  Yes


Emergency Medical Condition:  Yes


Medical Record Reviewed:  Yes


Differential Diagnosis


Influenza, anemia, electron imbalance, UTI


Narrative Course


CBC & BMP Diagram


17 17:41








Calcium Level 8.5





The patient is resting comfortably and feels better, is alert and in no 

distress. The patients results and examination findings were discussed.  The 

repeat examination is unremarkable and benign. The history, exam, diagnostic 

testing, and current condition do not suggest any significant pathology to 

warrant further testing, continued ED treatment, admission, or surgical 

evaluation at this point. The vital signs have been stable. The patient does 

not have uncontrollable pain, intractable vomiting, or other significant 

symptoms. The patient's condition is stable and appropriate for discharge. The 

patient will pursue further outpatient evaluation with a primary care physician 

or other designated or consulting physician as indicated in the discharge 

instructions. The patient expressed understanding and was agreeable with this 

plan.





Diagnosis





 Primary Impression:  


 Weakness


 Additional Impression:  


 Dizziness





***Additional Instructions:  


You have a choice when it comes to health care, and we are glad that you chose 

[facility]. Hopefully, we have met your expectations on today's visit.  You 

are welcome to return to [facility] at any time, as we are committed to 

meeting the health care needs of our community.


***Med/Other Pt SpecificInfo:  Prescription(s) given


Scripts


Meclizine (Meclizine) 25 Mg Tab


25 MG PO AS DIRECTED Y for VERTIGO, #20 TAB 0 Refills


   Prov: Joshua Carcamo MD         17


Disposition:  01 DISCHARGE HOME


Condition:  Stable











Joshua Carcamo MD Sep 24, 2017 18:04 Pt providing urine sample at this time.

## 2017-11-27 ENCOUNTER — HOSPITAL ENCOUNTER (EMERGENCY)
Dept: HOSPITAL 17 - NEPD | Age: 42
Discharge: HOME | End: 2017-11-27
Payer: MEDICAID

## 2017-11-27 VITALS
TEMPERATURE: 99 F | RESPIRATION RATE: 18 BRPM | DIASTOLIC BLOOD PRESSURE: 66 MMHG | SYSTOLIC BLOOD PRESSURE: 122 MMHG | HEART RATE: 80 BPM | OXYGEN SATURATION: 99 %

## 2017-11-27 VITALS
RESPIRATION RATE: 18 BRPM | DIASTOLIC BLOOD PRESSURE: 67 MMHG | OXYGEN SATURATION: 100 % | HEART RATE: 77 BPM | SYSTOLIC BLOOD PRESSURE: 148 MMHG

## 2017-11-27 DIAGNOSIS — K21.9: ICD-10-CM

## 2017-11-27 DIAGNOSIS — F90.9: ICD-10-CM

## 2017-11-27 DIAGNOSIS — F41.9: ICD-10-CM

## 2017-11-27 DIAGNOSIS — F32.9: ICD-10-CM

## 2017-11-27 DIAGNOSIS — I10: ICD-10-CM

## 2017-11-27 DIAGNOSIS — R56.9: ICD-10-CM

## 2017-11-27 DIAGNOSIS — R42: Primary | ICD-10-CM

## 2017-11-27 DIAGNOSIS — Z79.899: ICD-10-CM

## 2017-11-27 DIAGNOSIS — R11.0: ICD-10-CM

## 2017-11-27 LAB
ANION GAP SERPL CALC-SCNC: 5 MEQ/L (ref 5–15)
BACTERIA #/AREA URNS HPF: (no result) /HPF
BASOPHILS # BLD AUTO: 0 TH/MM3 (ref 0–0.2)
BASOPHILS NFR BLD: 0.4 % (ref 0–2)
BUN SERPL-MCNC: 11 MG/DL (ref 7–18)
CHLORIDE SERPL-SCNC: 104 MEQ/L (ref 98–107)
COLOR UR: YELLOW
COMMENT (UR): (no result)
CULTURE IF INDICATED: (no result)
EOSINOPHIL # BLD: 0.1 TH/MM3 (ref 0–0.4)
EOSINOPHIL NFR BLD: 1.4 % (ref 0–4)
ERYTHROCYTE [DISTWIDTH] IN BLOOD BY AUTOMATED COUNT: 13.4 % (ref 11.6–17.2)
GFR SERPLBLD BASED ON 1.73 SQ M-ARVRAT: 106 ML/MIN (ref 89–?)
GLUCOSE UR STRIP-MCNC: (no result) MG/DL
HCO3 BLD-SCNC: 30.4 MEQ/L (ref 21–32)
HCT VFR BLD CALC: 36.1 % (ref 35–46)
HEMO FLAGS: (no result)
HGB UR QL STRIP: (no result)
KETONES UR STRIP-MCNC: (no result) MG/DL
LYMPHOCYTES # BLD AUTO: 1.5 TH/MM3 (ref 1–4.8)
LYMPHOCYTES NFR BLD AUTO: 33.8 % (ref 9–44)
MCH RBC QN AUTO: 31.6 PG (ref 27–34)
MCHC RBC AUTO-ENTMCNC: 34.4 % (ref 32–36)
MCV RBC AUTO: 91.9 FL (ref 80–100)
MONOCYTES NFR BLD: 9.3 % (ref 0–8)
MUCOUS THREADS #/AREA URNS LPF: (no result) /LPF
NEUTROPHILS # BLD AUTO: 2.5 TH/MM3 (ref 1.8–7.7)
NEUTROPHILS NFR BLD AUTO: 55.1 % (ref 16–70)
NITRITE UR QL STRIP: (no result)
PLATELET # BLD: 191 TH/MM3 (ref 150–450)
POTASSIUM SERPL-SCNC: 3.9 MEQ/L (ref 3.5–5.1)
RBC # BLD AUTO: 3.92 MIL/MM3 (ref 4–5.3)
SODIUM SERPL-SCNC: 139 MEQ/L (ref 136–145)
SP GR UR STRIP: 1.02 (ref 1–1.03)
SQUAMOUS #/AREA URNS HPF: 2 /HPF (ref 0–5)
WBC # BLD AUTO: 4.5 TH/MM3 (ref 4–11)

## 2017-11-27 PROCEDURE — 96375 TX/PRO/DX INJ NEW DRUG ADDON: CPT

## 2017-11-27 PROCEDURE — 96374 THER/PROPH/DIAG INJ IV PUSH: CPT

## 2017-11-27 PROCEDURE — 96372 THER/PROPH/DIAG INJ SC/IM: CPT

## 2017-11-27 PROCEDURE — 80048 BASIC METABOLIC PNL TOTAL CA: CPT

## 2017-11-27 PROCEDURE — 85025 COMPLETE CBC W/AUTO DIFF WBC: CPT

## 2017-11-27 PROCEDURE — 81001 URINALYSIS AUTO W/SCOPE: CPT

## 2017-11-27 PROCEDURE — 99284 EMERGENCY DEPT VISIT MOD MDM: CPT

## 2017-11-27 PROCEDURE — 87086 URINE CULTURE/COLONY COUNT: CPT

## 2017-11-27 NOTE — PD
HPI


Chief Complaint:  Dizziness


Time Seen by Provider:  20:09


Travel History


International Travel<30 days:  No


Contact w/Intl Traveler<30days:  No


Traveled to known affect area:  No





History of Present Illness


HPI


42-year-old female with history of vertigo presents to the emergency room for 

evaluation of the same.  She has had vertigo for 4 years.  States this episode 

has lasted 4 days which is not unusual for her.  Symptoms are constant.  

Described as feeling like the world is spinning around her.  She has been seen 

multiple times for in the ER for this previously and followed up with her 

primary care physician.  Her PCP, Dr. Mcknight, prescribes her meclizine.  

States that medication doesn't work.  She has associated nausea.  No vomiting.  

Occasional headaches.  Patient is worried she is going to fall.  States her 

symptoms have progressively been worsening.





PFSH


Past Medical History


ADHD:  Yes


Anxiety:  Yes


Depression:  Yes


Heart Rhythm Problems:  No


Cardiac Catheterization:  No


Cardiovascular Problems:  Yes (HTN)


High Cholesterol:  No


Congestive Heart Failure:  No


Diabetes:  No


Diminished Hearing:  No


Gastrointestinal Disorders:  Yes


GERD:  Yes


Heparin Induced Thrombocytopen:  No


Hypertension:  Yes


Musculoskeletal:  Yes (CHRONIC BACK PAIN)


Neurologic:  Yes


Psychiatric:  Yes


Reproductive:  Yes


Immunizations Current:  Yes


Migraines:  Yes


Seizures:  Yes (hx)


Ulcer:  Yes


Pregnant?:  Not Pregnant


Menopausal:  Yes


:  4


Para:  3


Miscarriage:  1


:  0


Ovarian Cysts:  Yes


Dilation and Curettage (D&C):  Yes


Tubal Ligation:  Yes





Past Surgical History


Abdominal Surgery:  Yes (UMBILICAL HERNIA REPAIR)


Body Medical Devices:  UMBILICAL HERNIA


Coronary Artery Bypass Graft:  No


Gynecologic Surgery:  Yes


Hysterectomy:  Yes


Other Surgery:  Yes (umbukical hernia <WOW)





Social History


Alcohol Use:  Yes ('SOMETIMES")


Tobacco Use:  No


Substance Use:  No





Allergies-Medications


(Allergen,Severity, Reaction):  


Coded Allergies:  


     acetaminophen (Unverified  Adverse Reaction, Intermediate, Nausea/Vomiting

, 8/15/17)


     hydrocodone (Unverified  Adverse Reaction, Intermediate, Nausea/Vomiting, 8

/15/17)


     tramadol (Unverified  Adverse Reaction, Intermediate, Nausea/Vomiting, 8/15

/17)


Reported Meds & Prescriptions





Reported Meds & Active Scripts


Active


Diphenhydramine (Diphenhydramine HCl) 25 Mg Cap 25 Mg PO Q8HR PRN


Reglan (Metoclopramide HCl) 5 Mg Tab 5 Mg PO Q8HR


Meclizine (Meclizine HCl) 25 Mg Tab 25 Mg PO AS DIRECTED PRN


Ondansetron Odt 4 Mg Tab 4 Mg SL Q6HR PRN


Percocet (Oxycodone-Acetaminophen) 5-325 mg Tab 1 Tab PO Q6H PRN


Zofran (Ondansetron HCl) 4 Mg Tab 4 Mg PO Q6HR PRN


Xanax (Alprazolam) 0.5 Mg Tab 0.5 Mg PO Q8H PRN


Reported


Tizanidine (Tizanidine HCl) 4 Mg Tab 4 Mg PO TID


Mirtazapine 15 Mg Tab 15 Mg PO HS


Amlodipine (Amlodipine Besylate) 5 Mg Tab 5 Mg PO DAILY








Review of Systems


Except as stated in HPI:  all other systems reviewed are Neg





Physical Exam


Narrative


GENERAL: Well-nourished, well-developed female in no acute distress.  Afebrile.

  Ambulatory.


SKIN: Focused skin assessment warm/dry.


HEAD: Normocephalic.


EYES: No scleral icterus. No injection or drainage. 


EARS: Bilateral pinnae and external canals appear within normal limits. 

Bilateral tympanic membranes without erythema, dullness or perforation.


NECK: Supple, trachea midline. No JVD or lymphadenopathy.


CARDIOVASCULAR: Regular rate and rhythm without murmurs, gallops, or rubs. 


RESPIRATORY: Breath sounds equal bilaterally. No accessory muscle use.


NEUROLOGICAL: Awake and alert. Cranial nerves II through XII intact.  Motor and 

sensory grossly within normal limits. Five out of 5 muscle strength in all 

muscle groups.  Normal speech. No pronator drift in upper or lower extremities.





Data


Data


Last Documented VS





Vital Signs








  Date Time  Temp Pulse Resp B/P (MAP) Pulse Ox O2 Delivery O2 Flow Rate FiO2


 


17 21:30        


 


17 20:12  77 18  100 Room Air  


 


17 17:06 99.0       








Orders





 Orders


Complete Blood Count With Diff (17 17:33)


Basic Metabolic Panel (Bmp) (17 17:33)


Urinalysis - C+S If Indicated (17 17:33)


Urine Culture (17 20:19)


Prochlorperazine Inj (Compazine Inj) (17 20:45)


Diphenhydramine Inj (Benadryl Inj) (17 20:45)


Diphenhydramine Inj (Benadryl Inj) (17 21:00)


Prochlorperazine Inj (Compazine Inj) (17 21:00)


Ed Discharge Order (17 21:09)





Labs





Laboratory Tests








Test


  17


17:50 17


17:55


 


White Blood Count 4.5 TH/MM3  


 


Red Blood Count 3.92 MIL/MM3  


 


Hemoglobin 12.4 GM/DL  


 


Hematocrit 36.1 %  


 


Mean Corpuscular Volume 91.9 FL  


 


Mean Corpuscular Hemoglobin 31.6 PG  


 


Mean Corpuscular Hemoglobin


Concent 34.4 % 


  


 


 


Red Cell Distribution Width 13.4 %  


 


Platelet Count 191 TH/MM3  


 


Mean Platelet Volume 7.4 FL  


 


Neutrophils (%) (Auto) 55.1 %  


 


Lymphocytes (%) (Auto) 33.8 %  


 


Monocytes (%) (Auto) 9.3 %  


 


Eosinophils (%) (Auto) 1.4 %  


 


Basophils (%) (Auto) 0.4 %  


 


Neutrophils # (Auto) 2.5 TH/MM3  


 


Lymphocytes # (Auto) 1.5 TH/MM3  


 


Monocytes # (Auto) 0.4 TH/MM3  


 


Eosinophils # (Auto) 0.1 TH/MM3  


 


Basophils # (Auto) 0.0 TH/MM3  


 


CBC Comment DIFF FINAL  


 


Differential Comment   


 


Blood Urea Nitrogen 11 MG/DL  


 


Creatinine 0.72 MG/DL  


 


Random Glucose 85 MG/DL  


 


Calcium Level 8.9 MG/DL  


 


Sodium Level 139 MEQ/L  


 


Potassium Level 3.9 MEQ/L  


 


Chloride Level 104 MEQ/L  


 


Carbon Dioxide Level 30.4 MEQ/L  


 


Anion Gap 5 MEQ/L  


 


Estimat Glomerular Filtration


Rate 106 ML/MIN 


  


 


 


Urine Color  YELLOW 


 


Urine Turbidity  CLEAR 


 


Urine pH  6.0 


 


Urine Specific Gravity  1.024 


 


Urine Protein  TRACE mg/dL 


 


Urine Glucose (UA)  NEG mg/dL 


 


Urine Ketones  NEG mg/dL 


 


Urine Occult Blood  NEG 


 


Urine Nitrite  NEG 


 


Urine Bilirubin  NEG 


 


Urine Urobilinogen


  


  LESS THAN 2.0


MG/DL


 


Urine Leukocyte Esterase  LARGE 


 


Urine RBC  1 /hpf 


 


Urine WBC  4 /hpf 


 


Urine Squamous Epithelial


Cells 


  2 /hpf 


 


 


Urine Bacteria  RARE /hpf 


 


Urine Mucus  FEW /lpf 


 


Microscopic Urinalysis Comment


  


  CULT NOT


INDICATED











MDM


Medical Decision Making


Medical Screen Exam Complete:  Yes


Emergency Medical Condition:  Yes


Medical Record Reviewed:  Yes


Differential Diagnosis


Vertigo, dizziness, nausea, headache


Narrative Course


42-year-old female with history of vertigo presents to the emergency room for 

evaluation of the same.  States she gets episodes that last several days.  This 

started 4 days ago and is constant.  Described as the world spinning around 

her.  Her prescription for meclizine is not helping.  She has never followed up 

with ENT or neurology regarding this.  She has had several workups in the 

emergency room for this until all been unremarkable.  She was given Benadryl 

and Compazine in the emergency room.  CBC and BMP are completely unremarkable.  

UA shows no evidence of infection.  No indication for admission at this time.  

Patient discharged with Reglan and Benadryl and told to follow-up with primary 

care physician for outpatient imaging or return for worsening symptoms.  She 

understands and agrees to plan.





Diagnosis





 Primary Impression:  


 Vertigo


Referrals:  


Neurologist





Primary Care Physician





***Additional Instructions:  


Reglan as directed, as needed for dizziness.


Take Benadryl with Reglan to prevent side effects.


Follow-up with Dr. Mcknight for outpatient referral to ENT or neurology.


Return for worsening symptoms.


***Med/Other Pt SpecificInfo:  Prescription(s) given


Scripts


Diphenhydramine (Diphenhydramine) 25 Mg Cap


25 MG PO Q8HR Y for ALLERGIES, #15 CAP 0 Refills


   Prov: Stewart Bell MD         17 


Metoclopramide (Reglan) 5 Mg Tab


5 MG PO Q8HR, #15 TAB 0 Refills


   Prov: Stewart Bell MD         17


Disposition:  01 DISCHARGE HOME


Condition:  Stable











Chica Moser 2017 20:51

## 2017-12-20 ENCOUNTER — HOSPITAL ENCOUNTER (EMERGENCY)
Dept: HOSPITAL 17 - NEPD | Age: 42
Discharge: HOME | End: 2017-12-20
Payer: MEDICAID

## 2017-12-20 VITALS
TEMPERATURE: 97.6 F | RESPIRATION RATE: 18 BRPM | OXYGEN SATURATION: 96 % | DIASTOLIC BLOOD PRESSURE: 70 MMHG | HEART RATE: 76 BPM | SYSTOLIC BLOOD PRESSURE: 140 MMHG

## 2017-12-20 DIAGNOSIS — I10: ICD-10-CM

## 2017-12-20 DIAGNOSIS — K21.9: ICD-10-CM

## 2017-12-20 DIAGNOSIS — F32.9: ICD-10-CM

## 2017-12-20 DIAGNOSIS — Z88.6: ICD-10-CM

## 2017-12-20 DIAGNOSIS — F41.9: ICD-10-CM

## 2017-12-20 DIAGNOSIS — Z88.5: ICD-10-CM

## 2017-12-20 DIAGNOSIS — F90.9: ICD-10-CM

## 2017-12-20 DIAGNOSIS — Z90.710: ICD-10-CM

## 2017-12-20 DIAGNOSIS — R10.13: Primary | ICD-10-CM

## 2017-12-20 LAB
ALBUMIN SERPL-MCNC: 4.3 GM/DL (ref 3.4–5)
ALP SERPL-CCNC: 85 U/L (ref 45–117)
ALT SERPL-CCNC: 17 U/L (ref 10–53)
AST SERPL-CCNC: 20 U/L (ref 15–37)
BASOPHILS # BLD AUTO: 0 TH/MM3 (ref 0–0.2)
BASOPHILS NFR BLD: 0.5 % (ref 0–2)
BILIRUB SERPL-MCNC: 0.4 MG/DL (ref 0.2–1)
BUN SERPL-MCNC: 12 MG/DL (ref 7–18)
CALCIUM SERPL-MCNC: 9.3 MG/DL (ref 8.5–10.1)
CHLORIDE SERPL-SCNC: 103 MEQ/L (ref 98–107)
COLOR UR: (no result)
CREAT SERPL-MCNC: 0.73 MG/DL (ref 0.5–1)
EOSINOPHIL # BLD: 0.1 TH/MM3 (ref 0–0.4)
EOSINOPHIL NFR BLD: 1.1 % (ref 0–4)
ERYTHROCYTE [DISTWIDTH] IN BLOOD BY AUTOMATED COUNT: 13.8 % (ref 11.6–17.2)
GFR SERPLBLD BASED ON 1.73 SQ M-ARVRAT: 105 ML/MIN (ref 89–?)
GLUCOSE SERPL-MCNC: 85 MG/DL (ref 74–106)
GLUCOSE UR STRIP-MCNC: (no result) MG/DL
HCO3 BLD-SCNC: 29.2 MEQ/L (ref 21–32)
HCT VFR BLD CALC: 38.3 % (ref 35–46)
HGB BLD-MCNC: 12.8 GM/DL (ref 11.6–15.3)
HGB UR QL STRIP: (no result)
KETONES UR STRIP-MCNC: (no result) MG/DL
LIPASE: 220 U/L (ref 73–393)
LYMPHOCYTES # BLD AUTO: 1.6 TH/MM3 (ref 1–4.8)
LYMPHOCYTES NFR BLD AUTO: 31.4 % (ref 9–44)
MCH RBC QN AUTO: 30.8 PG (ref 27–34)
MCHC RBC AUTO-ENTMCNC: 33.5 % (ref 32–36)
MCV RBC AUTO: 91.9 FL (ref 80–100)
MONOCYTE #: 0.5 TH/MM3 (ref 0–0.9)
MONOCYTES NFR BLD: 9.2 % (ref 0–8)
NEUTROPHILS # BLD AUTO: 3 TH/MM3 (ref 1.8–7.7)
NEUTROPHILS NFR BLD AUTO: 57.8 % (ref 16–70)
NITRITE UR QL STRIP: (no result)
PLATELET # BLD: 227 TH/MM3 (ref 150–450)
PMV BLD AUTO: 6.8 FL (ref 7–11)
PROT SERPL-MCNC: 8.5 GM/DL (ref 6.4–8.2)
RBC # BLD AUTO: 4.16 MIL/MM3 (ref 4–5.3)
SODIUM SERPL-SCNC: 139 MEQ/L (ref 136–145)
SP GR UR STRIP: 1 (ref 1–1.03)
SQUAMOUS #/AREA URNS HPF: <1 /HPF (ref 0–5)
URINE LEUKOCYTE ESTERASE: (no result)
WBC # BLD AUTO: 5.2 TH/MM3 (ref 4–11)

## 2017-12-20 PROCEDURE — C9113 INJ PANTOPRAZOLE SODIUM, VIA: HCPCS

## 2017-12-20 PROCEDURE — 83690 ASSAY OF LIPASE: CPT

## 2017-12-20 PROCEDURE — 99285 EMERGENCY DEPT VISIT HI MDM: CPT

## 2017-12-20 PROCEDURE — 96375 TX/PRO/DX INJ NEW DRUG ADDON: CPT

## 2017-12-20 PROCEDURE — 93005 ELECTROCARDIOGRAM TRACING: CPT

## 2017-12-20 PROCEDURE — 80053 COMPREHEN METABOLIC PANEL: CPT

## 2017-12-20 PROCEDURE — 74177 CT ABD & PELVIS W/CONTRAST: CPT

## 2017-12-20 PROCEDURE — 81001 URINALYSIS AUTO W/SCOPE: CPT

## 2017-12-20 PROCEDURE — 85025 COMPLETE CBC W/AUTO DIFF WBC: CPT

## 2017-12-20 PROCEDURE — 96374 THER/PROPH/DIAG INJ IV PUSH: CPT

## 2017-12-20 NOTE — PD
HPI


Chief Complaint:  Abdominal Pain


Time Seen by Provider:  17:44


Travel History


International Travel<30 days:  No


Contact w/Intl Traveler<30days:  No


Traveled to known affect area:  No





History of Present Illness


HPI


42-year-old female here for evaluation of abdominal pain.  The patient reports 

mid and epigastric abdominal pain since yesterday evening.  Pain is moderate to 

severe, constant, intermittently worse at times, worse after eating, does not 

radiate, described as a pulling/twisting.  She denies chest pain or dyspnea.  

No fevers or chills.  No nausea or vomiting.  She has had diarrhea.  History of 

hysterectomy, no other abdominal surgeries.  No urinary symptoms.  She was seen 

in urgent care facility today and was told to present to the emergency 

department for further evaluation.





PFSH


Past Medical History


ADHD:  Yes


Anxiety:  Yes


Depression:  Yes


Heart Rhythm Problems:  No


Cardiac Catheterization:  No


Cardiovascular Problems:  Yes (HTN)


High Cholesterol:  No


Congestive Heart Failure:  No


Diabetes:  No


Diminished Hearing:  No


Gastrointestinal Disorders:  Yes


GERD:  Yes


Heparin Induced Thrombocytopen:  No


Hypertension:  Yes


Musculoskeletal:  Yes (CHRONIC BACK PAIN)


Neurologic:  Yes


Psychiatric:  Yes


Reproductive:  Yes


Immunizations Current:  Yes


Migraines:  Yes


Seizures:  Yes (hx)


Ulcer:  Yes


Pregnant?:  Not Pregnant


Menopausal:  Yes


:  4


Para:  3


Miscarriage:  1


:  0


Ovarian Cysts:  Yes


Dilation and Curettage (D&C):  Yes


Tubal Ligation:  Yes





Past Surgical History


Abdominal Surgery:  Yes (UMBILICAL HERNIA REPAIR)


Body Medical Devices:  UMBILICAL HERNIA


Coronary Artery Bypass Graft:  No


Gynecologic Surgery:  Yes


Hysterectomy:  Yes


Other Surgery:  Yes (umbukical hernia <WOW)





Social History


Alcohol Use:  Yes ('SOMETIMES")


Tobacco Use:  No


Substance Use:  No





Allergies-Medications


(Allergen,Severity, Reaction):  


Coded Allergies:  


     acetaminophen (Unverified  Adverse Reaction, Intermediate, Nausea/Vomiting

, 17)


     hydrocodone (Unverified  Adverse Reaction, Intermediate, Nausea/Vomiting, 

17)


     tramadol (Unverified  Adverse Reaction, Intermediate, Nausea/Vomiting, )


Reported Meds & Prescriptions





Reported Meds & Active Scripts


Active


Meclizine (Meclizine HCl) 25 Mg Tab 25 Mg PO AS DIRECTED PRN


Xanax (Alprazolam) 0.5 Mg Tab 0.5 Mg PO Q8H PRN


Reported


Prilosec (Omeprazole Magnesium) 10 Mg Pow   


Tizanidine (Tizanidine HCl) 4 Mg Tab 4 Mg PO TID


Mirtazapine 15 Mg Tab 15 Mg PO HS


Amlodipine (Amlodipine Besylate) 5 Mg Tab 5 Mg PO DAILY








Review of Systems


Except as stated in HPI:  all other systems reviewed are Neg





Physical Exam


Narrative


GENERAL: Well-developed, well-nourished, comfortable, no apparent distress.


SKIN: Focused skin assessment warm/dry.


HEAD: Atraumatic. Normocephalic. 


EYES: Pupils equal and round. No scleral icterus. No injection or drainage. 


ENT: No nasal bleeding or discharge.  Mucous membranes pink and moist.  

Edentulous.


NECK: Trachea midline. No JVD. 


CARDIOVASCULAR: Regular rate and rhythm.  


RESPIRATORY: No accessory muscle use. Clear to auscultation. Breath sounds 

equal bilaterally. 


GASTROINTESTINAL: Abdomen soft, nondistended.  Mild mid and epigastric 

tenderness without peritoneal signs.  Rest of abdomen is soft and nontender.  

Normal bowel sounds.


MUSCULOSKELETAL: No obvious deformities. No clubbing.  No cyanosis.  No edema. 


NEUROLOGICAL: Awake and alert. No obvious cranial nerve deficits.  Motor 

grossly within normal limits. Normal speech.


PSYCHIATRIC: Appropriate mood and affect; insight and judgment normal.





Data


Data


Last Documented VS





Vital Signs








  Date Time  Temp Pulse Resp B/P (MAP) Pulse Ox O2 Delivery O2 Flow Rate FiO2


 


17 16:03 97.6 76 18 140/70 (93) 96   








Orders





 Orders


Complete Blood Count With Diff (17 16:12)


Comprehensive Metabolic Panel (17 16:12)


Lipase (17 16:12)


Urinalysis - C+S If Indicated (17 16:12)


Ct Abd/Pel W Iv Contrast(Rout) (17 17:47)


Iv Access Insert/Monitor (17 17:47)


Ecg Monitoring (17 17:47)


Oximetry (17 17:47)


Pantoprazole Inj (Protonix Inj) (17 18:00)


Sodium Chlor 0.9% 1000 Ml Inj (Ns 1000 M (17 17:47)


Sodium Chloride 0.9% Flush (Ns Flush) (17 18:00)


Al-Mag Hy-Si 40-40-4 Mg/Ml Liq (Mag-Al P (17 18:00)


Lidocaine 2% Viscous (Xylocaine 2% Visco (17 18:00)


Sucralfate Liq (Carafate Liq) (17 18:00)


Metoclopramide Inj (Reglan Inj) (17 18:00)


Electrocardiogram (17 )


Iohexol 350 Inj (Omnipaque 350 Inj) (17 16:03)





Labs





Laboratory Tests








Test


  17


16:27


 


White Blood Count 5.2 TH/MM3 


 


Red Blood Count 4.16 MIL/MM3 


 


Hemoglobin 12.8 GM/DL 


 


Hematocrit 38.3 % 


 


Mean Corpuscular Volume 91.9 FL 


 


Mean Corpuscular Hemoglobin 30.8 PG 


 


Mean Corpuscular Hemoglobin


Concent 33.5 % 


 


 


Red Cell Distribution Width 13.8 % 


 


Platelet Count 227 TH/MM3 


 


Mean Platelet Volume 6.8 FL 


 


Neutrophils (%) (Auto) 57.8 % 


 


Lymphocytes (%) (Auto) 31.4 % 


 


Monocytes (%) (Auto) 9.2 % 


 


Eosinophils (%) (Auto) 1.1 % 


 


Basophils (%) (Auto) 0.5 % 


 


Neutrophils # (Auto) 3.0 TH/MM3 


 


Lymphocytes # (Auto) 1.6 TH/MM3 


 


Monocytes # (Auto) 0.5 TH/MM3 


 


Eosinophils # (Auto) 0.1 TH/MM3 


 


Basophils # (Auto) 0.0 TH/MM3 


 


CBC Comment DIFF FINAL 


 


Differential Comment  


 


Urine Color LIGHT-YELLOW 


 


Urine Turbidity CLEAR 


 


Urine pH 5.5 


 


Urine Specific Gravity 1.005 


 


Urine Protein NEG mg/dL 


 


Urine Glucose (UA) NEG mg/dL 


 


Urine Ketones NEG mg/dL 


 


Urine Occult Blood NEG 


 


Urine Nitrite NEG 


 


Urine Bilirubin NEG 


 


Urine Urobilinogen


  LESS THAN 2.0


MG/DL


 


Urine Leukocyte Esterase NEG 


 


Urine RBC


  LESS THAN 1


/hpf


 


Urine WBC


  LESS THAN 1


/hpf


 


Urine Squamous Epithelial


Cells <1 /hpf 


 


 


Microscopic Urinalysis Comment


  CULT NOT


INDICATED


 


Blood Urea Nitrogen 12 MG/DL 


 


Creatinine 0.73 MG/DL 


 


Random Glucose 85 MG/DL 


 


Total Protein 8.5 GM/DL 


 


Albumin 4.3 GM/DL 


 


Calcium Level 9.3 MG/DL 


 


Alkaline Phosphatase 85 U/L 


 


Aspartate Amino Transf


(AST/SGOT) 20 U/L 


 


 


Alanine Aminotransferase


(ALT/SGPT) 17 U/L 


 


 


Total Bilirubin 0.4 MG/DL 


 


Sodium Level 139 MEQ/L 


 


Potassium Level 3.7 MEQ/L 


 


Chloride Level 103 MEQ/L 


 


Carbon Dioxide Level 29.2 MEQ/L 


 


Anion Gap 7 MEQ/L 


 


Estimat Glomerular Filtration


Rate 105 ML/MIN 


 


 


Lipase 220 U/L 











MDM


Medical Decision Making


Medical Screen Exam Complete:  Yes


Emergency Medical Condition:  Yes


Medical Record Reviewed:  Yes


Interpretation(s)


EKG: Sinus, rate 88, normal axis, normal intervals, no acute ischemic 

abnormality.


Differential Diagnosis


Gastritis, peptic ulcer disease, pancreatitis, hepatobiliary disease, colitis,


Narrative Course


Initial vital signs show heart rate 76, blood pressure 140/70, pulse ox 96% on 

room air, tympanic temp of 97.6F.





CBC is unremarkable.


CMP is unremarkable.


Lipase is 220.





UA is not suggestive of UTI.





CT abdomen pelvis:


Negative CT abdomen pelvis with contrast.





Patient was given IV Reglan, GI cocktail, Protonix, and Carafate, and on 

reassessment she is resting comfortably.  She likely has gastritis or peptic 

ulcer disease.  She is stable for discharge home with further outpatient follow-

up with a primary care physician or gastroenterologist this week.  She was 

informed on when to return to the emergency department.  She verbalizes 

understanding and agreement with plan.





Diagnosis





 Primary Impression:  


 Epigastric abdominal pain


Referrals:  


Lei Dee MD


3 days


Gastroenterologist





Primary Care Physician





***Additional Instructions:  


Follow-up with your primary care physician this week.


Follow-up with gastric neurologist Dr. Dee or a gastroenterologist of your 

choice this week.


Return to the emergency department for worsening symptoms or any other concerns.


Scripts


Pantoprazole (Protonix) 40 Mg Tab


40 MG PO DAILY for Reflux, #30 TAB 0 Refills


   Prov: Stewart Bell MD         17











Stewart Bell MD Dec 20, 2017 17:50

## 2017-12-20 NOTE — RADRPT
EXAM DATE/TIME:  12/20/2017 18:34 

 

[facility] COMPARISON:     

No previous studies available for comparison.

 

 

INDICATIONS :     

Patient complains of epigastric pain.

                      

 

IV CONTRAST:     

96 cc Omnipaque 350 (iohexol) IV 

 

 

ORAL CONTRAST:      

No oral contrast ingested.

                      

 

RADIATION DOSE:     

8.32 CTDIvol (mGy) 

 

 

MEDICAL HISTORY :     

Hypertension. Ulcers. 

 

SURGICAL HISTORY :      

Hysterectomy. Umbilical hernia repair.

 

ENCOUNTER:      

Initial

 

ACUITY:      

1 day

 

PAIN SCALE:      

5/10

 

LOCATION:        

upper quadrant 

 

TECHNIQUE:     

Volumetric scanning of the abdomen and pelvis was performed.  Using automated exposure control and ad
justment of the mA and/or kV according to patient size, radiation dose was kept as low as reasonably 
achievable to obtain optimal diagnostic quality images.  DICOM format image data is available electro
nically for review and comparison.  

 

FINDINGS:     

 

LOWER LUNGS:     

The visualized lower lungs are clear.

 

LIVER:     

Homogeneous density without lesion.  There is no dilation of the biliary tree.  No calcified gallston
es.

 

SPLEEN:     

Normal size without lesion.

 

PANCREAS:     

Within normal limits.

 

KIDNEYS:     

Normal in size and shape.  There is no mass, stone or hydronephrosis.

 

ADRENAL GLANDS:     

Within normal limits.

 

VASCULAR:     

There is no aortic aneurysm.

 

BOWEL/MESENTERY:     

No dilated loops of small or large bowel.  The appendix is identified in the right lower quadrant and
 the lumen contains gas, normal dimension.

 

ABDOMINAL WALL:     

Within normal limits.

 

RETROPERITONEUM:     

There is no lymphadenopathy. 

 

BLADDER:     

No wall thickening or mass. 

 

REPRODUCTIVE:     

Within normal limits.

 

INGUINAL:     

There is no lymphadenopathy or hernia. 

 

MUSCULOSKELETAL:     

Within normal limits for patient age. 

 

CONCLUSION:     

1. Negative CT abdomen/pelvis with contrast.

 

 

 

 Bari Talamantes MD on December 20, 2017 at 19:13           

Board Certified Radiologist.

 This report was verified electronically.

## 2017-12-21 NOTE — EKG
Date Performed: 12/20/2017       Time Performed: 19:05:17

 

PTAGE:      42 years

 

EKG:      Sinus rhythm 

 

 Compared to previous tracing early repolarization is no longer noted NORMAL ECG

 

PREVIOUS TRACING       : 04/05/2017 10.51

 

DOCTOR:   Jw Amaya  Interpretating Date/Time  12/21/2017 14:00:21

## 2018-02-05 ENCOUNTER — HOSPITAL ENCOUNTER (EMERGENCY)
Dept: HOSPITAL 17 - NEPK | Age: 43
Discharge: HOME | End: 2018-02-05
Payer: MEDICAID

## 2018-02-05 VITALS
RESPIRATION RATE: 17 BRPM | HEART RATE: 70 BPM | TEMPERATURE: 97.9 F | SYSTOLIC BLOOD PRESSURE: 109 MMHG | OXYGEN SATURATION: 99 % | DIASTOLIC BLOOD PRESSURE: 70 MMHG

## 2018-02-05 DIAGNOSIS — H65.03: Primary | ICD-10-CM

## 2018-02-05 DIAGNOSIS — I10: ICD-10-CM

## 2018-02-05 PROCEDURE — 99283 EMERGENCY DEPT VISIT LOW MDM: CPT

## 2018-02-05 NOTE — PD
HPI


Chief Complaint:  ENT Complaint


Time Seen by Provider:  14:35


Travel History


International Travel<30 days:  No


Contact w/Intl Traveler<30days:  No


Traveled to known affect area:  No





History of Present Illness


HPI


42-year-old female presents to the ED for evaluation of 2 day history of 

bilateral ear pain.  Rated 10 out of 10, no alleviating or exacerbating factors 

reported.  The patient states that symptoms came on gradually.  She endorses 

"bubbles" sensation in the ear.  She endorses diminished hearing.  She denies 

dizziness, fever, chills, nausea, vomiting.  She states that she is just 

recovering from a viral syndrome.  She states that her cough and sinus 

congestion and rhinorrhea have improved.  She is been taking ibuprofen at home 

with no improvement of symptoms, last dose last night..





PFSH


Past Medical History


ADHD:  Yes


Anxiety:  Yes


Depression:  Yes


Heart Rhythm Problems:  No


Cardiac Catheterization:  No


Cardiovascular Problems:  Yes (HTN)


High Cholesterol:  No


Congestive Heart Failure:  No


Diabetes:  No


Diminished Hearing:  No


Gastrointestinal Disorders:  Yes


GERD:  Yes


Heparin Induced Thrombocytopen:  No


Hypertension:  Yes


Musculoskeletal:  Yes (CHRONIC BACK PAIN)


Neurologic:  Yes


Psychiatric:  Yes


Reproductive:  Yes


Immunizations Current:  Yes


Migraines:  Yes


Seizures:  Yes (hx)


Ulcer:  Yes


Tetanus Vaccination:  < 5 Years


Influenza Vaccination:  Yes


Pregnant?:  Not Pregnant


Menopausal:  Yes


:  4


Para:  3


Miscarriage:  1


:  0


Ovarian Cysts:  Yes


Dilation and Curettage (D&C):  Yes


Tubal Ligation:  Yes





Past Surgical History


Abdominal Surgery:  Yes (UMBILICAL HERNIA REPAIR)


Body Medical Devices:  UMBILICAL HERNIA


Coronary Artery Bypass Graft:  No


Gynecologic Surgery:  Yes


Hysterectomy:  Yes


Other Surgery:  Yes (umbukical hernia <WOW)





Social History


Alcohol Use:  Yes ('SOMETIMES")


Tobacco Use:  No


Substance Use:  No





Allergies-Medications


(Allergen,Severity, Reaction):  


Coded Allergies:  


     acetaminophen (Unverified  Adverse Reaction, Intermediate, Nausea/Vomiting

, 18)


     hydrocodone (Unverified  Adverse Reaction, Intermediate, Nausea/Vomiting, )


     tramadol (Unverified  Adverse Reaction, Intermediate, Nausea/Vomiting, )


Reported Meds & Prescriptions





Reported Meds & Active Scripts


Active


Allegra-D 24 Hour Allergy (Fexofenadine-Pseudoephedrine ER 24 HR) 180-240 Hector 

1 Tab PO DAILY 14 Days


Ibuprofen 600 Mg Tab 600 Mg PO Q8H PRN


Amoxicillin 500 Mg Cap 500 Mg PO TID 7 Days


Protonix (Pantoprazole Sodium) 40 Mg Tab 40 Mg PO DAILY


Meclizine (Meclizine HCl) 25 Mg Tab 25 Mg PO AS DIRECTED PRN


Xanax (Alprazolam) 0.5 Mg Tab 0.5 Mg PO Q8H PRN


Reported


Prilosec (Omeprazole Magnesium) 10 Mg Pow   


Mirtazapine 15 Mg Tab 15 Mg PO HS


Amlodipine (Amlodipine Besylate) 5 Mg Tab 5 Mg PO DAILY








Review of Systems


Except as stated in HPI:  all other systems reviewed are Neg





Physical Exam


Narrative


GENERAL: Well-nourished, well-developed  female in no acute 

distress.


SKIN: Warm and dry.


HEAD: Normocephalic.  Atraumatic.


EYES: No scleral icterus. No injection or drainage.  PERRLA.  EOMI.


ENT: Pearly gray tympanic membranes bilaterally.  Serous effusions bilaterally.

  Positive air-fluid level.  Nasal mucosa is moist.  Oropharynx without erythema

, edema or exudate.


NECK: Supple, trachea midline. No JVD or lymphadenopathy.


CARDIOVASCULAR: Regular rate and rhythm without murmurs, gallops, or rubs.


RESPIRATORY: Breath sounds clear and equal bilaterally. No accessory muscle use.


GASTROINTESTINAL: Abdomen soft, non-tender, nondistended. + Bowel sounds


MUSCULOSKELETAL: No cyanosis, or edema.  


BACK: Nontender without obvious deformity. No CVA tenderness.





Data


Data


Last Documented VS





Vital Signs








  Date Time  Temp Pulse Resp B/P (MAP) Pulse Ox O2 Delivery O2 Flow Rate FiO2


 


18 13:15 97.9 70 17 109/70 (83) 99   








Orders





 Orders


Ed Discharge Order (18 15:00)


Ibuprofen (Motrin) (18 15:15)








[facility]


Medical Decision Making


Medical Screen Exam Complete:  Yes


Emergency Medical Condition:  Yes


Differential Diagnosis


Otitis media versus otitis externa versus viral syndrome versus foreign body 

versus other


Narrative Course


42-year-old female presents to the ED for evaluation of 2 day history of 

bilateral ear pain.   She endorses "bubbles" sensation in the ear.  She 

endorses diminished hearing.  She denies dizziness, fever, chills, nausea, 

vomiting.  She states that she is just recovering from a viral syndrome.  She 

states that her cough and sinus congestion and rhinorrhea have improved.  

Vitals reviewed.  Physical exam reveals bilateral serous effusions with 

positive air fluid balance.  Tympanic membranes are pearly gray bilaterally 

without loss of landmarks.  ENT exam is otherwise unremarkable.  Chest CTAB.  

This is serous otitis media.  Patient's prescribed amoxicillin, daily 

antihistamine/decongestant, ibuprofen.  She is instructed take the medications 

as prescribed, follow up with the ENT, return to the ED for worsening symptoms.

  She is stable and discharged home.





Diagnosis





 Primary Impression:  


 Bilateral serous otitis media


 Qualified Codes:  H65.03 - Acute serous otitis media, bilateral


Referrals:  


Ear / Nose / Throat Specialist


Patient Instructions:  General Instructions, Serous Otitis Media (ED)





***Additional Instructions:  


Rest, hydrate.


Begin antibiotics today and take them until every pill is gone.


Take daily antihistamines/ decongestant as prescribed.


Take ibuprofen as prescribed, a needed for pain.


Follow-up with the ear nose and throat specialist.


Return to the ED for worsening symptoms or any urgent or emergent medical 

condition.


***Med/Other Pt SpecificInfo:  Prescription(s) given


Scripts


Fexofenadine-Pseudoephedrine ER 24 HR (Allegra-D 24 Hour Allergy) 180-240 Hector


1 TAB PO DAILY for Allergy Management for 14 Days, #14 TAB 0 Refills


   Prov: Malia Padron MD         18 


Ibuprofen (Ibuprofen) 600 Mg Tab


600 MG PO Q8H Y for PAIN, #15 TAB 0 Refills


   Prov: Malia Padron MD         18 


Amoxicillin (Amoxicillin) 500 Mg Cap


500 MG PO TID for Infection for 7 Days, CAP 0 Refills


   Prov: Malia Padron MD         18


Disposition:  01 DISCHARGE HOME


Condition:  Stable











May Pires 2018 14:58

## 2018-04-01 ENCOUNTER — HOSPITAL ENCOUNTER (EMERGENCY)
Dept: HOSPITAL 17 - NEPD | Age: 43
Discharge: HOME | End: 2018-04-01
Payer: SELF-PAY

## 2018-04-01 VITALS
DIASTOLIC BLOOD PRESSURE: 73 MMHG | OXYGEN SATURATION: 99 % | HEART RATE: 71 BPM | TEMPERATURE: 98.2 F | SYSTOLIC BLOOD PRESSURE: 113 MMHG | RESPIRATION RATE: 16 BRPM

## 2018-04-01 VITALS — BODY MASS INDEX: 25.95 KG/M2 | HEIGHT: 67 IN | WEIGHT: 165.35 LBS

## 2018-04-01 DIAGNOSIS — F90.9: ICD-10-CM

## 2018-04-01 DIAGNOSIS — I10: ICD-10-CM

## 2018-04-01 DIAGNOSIS — G89.29: ICD-10-CM

## 2018-04-01 DIAGNOSIS — N83.209: ICD-10-CM

## 2018-04-01 DIAGNOSIS — Z72.0: ICD-10-CM

## 2018-04-01 DIAGNOSIS — M54.9: ICD-10-CM

## 2018-04-01 DIAGNOSIS — F32.9: ICD-10-CM

## 2018-04-01 DIAGNOSIS — K21.9: Primary | ICD-10-CM

## 2018-04-01 DIAGNOSIS — F41.9: ICD-10-CM

## 2018-04-01 PROCEDURE — 99282 EMERGENCY DEPT VISIT SF MDM: CPT

## 2018-04-01 NOTE — PD
HPI


.


Throat pain


Chief Complaint:  Oral / Dental Pain or Problem


Time Seen by Provider:  15:23


Travel History


International Travel<30 days:  No


Contact w/Intl Traveler<30days:  No


Traveled to known affect area:  No





History of Present Illness


HPI


This patient presents complaining with throat pain.  Onset was yesterday.  She 

states that she feels like she cannot breathe because of the pain in her 

throat.  She reports a previous similar episode and states that they had to do 

an endoscopy.  She does not know the results of the endoscopy.  She states that 

she is feeling the exact same way today.  She does not have any associated 

fever.  She has no pain with swallowing.  She has no cold symptoms.





PFSH


Past Medical History


ADHD:  Yes


Anxiety:  Yes


Depression:  Yes


Heart Rhythm Problems:  No


Cardiac Catheterization:  No


Cardiovascular Problems:  Yes (HTN)


High Cholesterol:  No


Congestive Heart Failure:  No


Diabetes:  No


Diminished Hearing:  No


Gastrointestinal Disorders:  Yes


GERD:  Yes


Heparin Induced Thrombocytopen:  No


Hypertension:  Yes


Musculoskeletal:  Yes (CHRONIC BACK PAIN)


Neurologic:  Yes


Psychiatric:  Yes


Reproductive:  Yes


Immunizations Current:  Yes


Migraines:  Yes


Seizures:  Yes (hx)


Ulcer:  Yes


Tetanus Vaccination:  Unknown


Influenza Vaccination:  Yes


Pregnant?:  Not Pregnant


Menopausal:  Yes


:  4


Para:  3


Miscarriage:  1


:  0


Ovarian Cysts:  Yes


Dilation and Curettage (D&C):  Yes


Tubal Ligation:  Yes





Past Surgical History


Abdominal Surgery:  Yes (UMBILICAL HERNIA REPAIR)


Body Medical Devices:  UMBILICAL HERNIA


Coronary Artery Bypass Graft:  No


Gynecologic Surgery:  Yes


Hysterectomy:  Yes


Other Surgery:  Yes (umbilical hernia )





Social History


Alcohol Use:  Yes ('SOMETIMES")


Tobacco Use:  Yes


Substance Use:  No





Allergies-Medications


(Allergen,Severity, Reaction):  


Coded Allergies:  


     acetaminophen (Unverified  Adverse Reaction, Intermediate, Nausea/Vomiting

, 18)


     hydrocodone (Unverified  Adverse Reaction, Intermediate, Nausea/Vomiting, )


     tramadol (Unverified  Adverse Reaction, Intermediate, Nausea/Vomiting, )


Reported Meds & Prescriptions





Reported Meds & Active Scripts


Active


Protonix (Pantoprazole Sodium) 40 Mg Tab 40 Mg PO DAILY


Meclizine (Meclizine HCl) 25 Mg Tab 25 Mg PO AS DIRECTED PRN


Xanax (Alprazolam) 0.5 Mg Tab 0.5 Mg PO Q8H PRN


Reported


Prilosec (Omeprazole Magnesium) 10 Mg Pow   


Mirtazapine 15 Mg Tab 15 Mg PO HS


Amlodipine (Amlodipine Besylate) 5 Mg Tab 5 Mg PO DAILY








Review of Systems


Except as stated in HPI:  all other systems reviewed are Neg


General / Constitutional:  No: Fever, Chills


Eyes:  No: Drainage, Redness


HENT:  Positive: Sore Throat, No: Rhinorrhea, Congestion





Physical Exam


Narrative


GENERAL: Awake and alert and in no distress.


SKIN:  warm/dry.


HEAD: Normocephalic. 


EYES: Pupils equal and round. No scleral icterus. No injection or drainage. 


ENT: No nasal bleeding or discharge.  Mucous membranes pink and moist.  There 

is no erythema or swelling of the oropharynx.


NECK: Trachea midline.  Full range of motion without pain.. No palpable masses 

in the neck.


RESPIRATORY: No accessory muscle use. Clear to auscultation. Breath sounds 

equal bilaterally. 


MUSCULOSKELETAL: No obvious deformities. 


NEUROLOGICAL: Awake and alert. No obvious cranial nerve deficits.  Motor 

grossly within normal limits. Normal speech.


PSYCHIATRIC: Appropriate mood and affect; insight and judgment normal.





Data


Data


Last Documented VS





Vital Signs








  Date Time  Temp Pulse Resp B/P (MAP) Pulse Ox O2 Delivery O2 Flow Rate FiO2


 


18 12:55 98.2 71 16 113/73 (86) 99   








Orders





 Orders


Al-Mag Hy-Si 40-40-4 Mg/Ml Liq (Mag-Al P (18 15:45)


Lidocaine 2% Viscous (Xylocaine 2% Visco (18 15:45)








MDM


Medical Decision Making


Medical Screen Exam Complete:  Yes


Emergency Medical Condition:  Yes


Medical Record Reviewed:  Yes (This patient was seen in 2016 for similar 

complaints.  She had an EGD done at that time which was negative.)


Differential Diagnosis


Differential diagnosis includes strep throat, viral pharyngitis, esophageal 

foreign body, esophageal stricture


Narrative Course


This patient presents complaining with pain in her throat.  She actually points 

to her neck.  She has no redness or swelling of the oropharynx.  She has no 

palpable masses in her neck.  Her lungs are clear.





I have given her a GI cocktail here and will discharge her with a prescription 

for Prilosec for possible reflux.  She is having no difficulty swallowing.  Her 

lungs are clear with good air movement throughout.  I do not suspect a serious 

etiology for her symptoms.





Diagnosis





 Primary Impression:  


 GERD (gastroesophageal reflux disease)


 Qualified Codes:  K21.9 - Gastro-esophageal reflux disease without esophagitis


Patient Instructions:  Gastroesophageal Reflux Disease (DC), General 

Instructions


***Med/Other Pt SpecificInfo:  Prescription(s) given


Scripts


Omeprazole Magnesium (Prilosec) 20 Mg Tab


1 TAB PO DAILY for indigestion, #30


   Prov: Apolonia Mcdonough MD         18


Disposition:  01 DISCHARGE HOME


Condition:  Stable











Apolonia Mcdonough MD 2018 15:45

## 2018-04-26 ENCOUNTER — HOSPITAL ENCOUNTER (EMERGENCY)
Dept: HOSPITAL 17 - NEPK | Age: 43
Discharge: HOME | End: 2018-04-26
Payer: COMMERCIAL

## 2018-04-26 VITALS
RESPIRATION RATE: 20 BRPM | HEART RATE: 77 BPM | OXYGEN SATURATION: 100 % | DIASTOLIC BLOOD PRESSURE: 85 MMHG | SYSTOLIC BLOOD PRESSURE: 141 MMHG | TEMPERATURE: 97.9 F
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
## 2018-04-26 DIAGNOSIS — I10: ICD-10-CM

## 2018-04-26 DIAGNOSIS — F32.9: ICD-10-CM

## 2018-04-26 DIAGNOSIS — M54.2: ICD-10-CM

## 2018-04-26 DIAGNOSIS — Z72.0: ICD-10-CM

## 2018-04-26 DIAGNOSIS — F90.9: ICD-10-CM

## 2018-04-26 DIAGNOSIS — F41.9: ICD-10-CM

## 2018-04-26 DIAGNOSIS — M54.5: Primary | ICD-10-CM

## 2018-04-26 DIAGNOSIS — K21.9: ICD-10-CM

## 2018-04-26 PROCEDURE — 72100 X-RAY EXAM L-S SPINE 2/3 VWS: CPT

## 2018-04-26 PROCEDURE — 99284 EMERGENCY DEPT VISIT MOD MDM: CPT

## 2018-04-26 PROCEDURE — 96372 THER/PROPH/DIAG INJ SC/IM: CPT

## 2018-04-26 PROCEDURE — 72125 CT NECK SPINE W/O DYE: CPT
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
## 2018-05-04 ENCOUNTER — HOSPITAL ENCOUNTER (EMERGENCY)
Dept: HOSPITAL 17 - NEPD | Age: 43
LOS: 1 days | Discharge: HOME | End: 2018-05-05
Payer: SELF-PAY

## 2018-05-04 VITALS
DIASTOLIC BLOOD PRESSURE: 71 MMHG | OXYGEN SATURATION: 100 % | SYSTOLIC BLOOD PRESSURE: 115 MMHG | RESPIRATION RATE: 16 BRPM | HEART RATE: 92 BPM | TEMPERATURE: 98 F

## 2018-05-04 VITALS — HEIGHT: 67 IN | BODY MASS INDEX: 25.09 KG/M2 | WEIGHT: 159.84 LBS

## 2018-05-04 DIAGNOSIS — R30.0: ICD-10-CM
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
## 2018-05-04 DIAGNOSIS — F32.9: ICD-10-CM

## 2018-05-04 DIAGNOSIS — F41.9: ICD-10-CM

## 2018-05-04 DIAGNOSIS — Z86.69: ICD-10-CM

## 2018-05-04 DIAGNOSIS — K21.9: ICD-10-CM

## 2018-05-04 DIAGNOSIS — I10: ICD-10-CM

## 2018-05-04 DIAGNOSIS — F90.9: ICD-10-CM

## 2018-05-04 DIAGNOSIS — S76.212A: Primary | ICD-10-CM

## 2018-05-04 PROCEDURE — 81001 URINALYSIS AUTO W/SCOPE: CPT

## 2018-05-04 PROCEDURE — 99283 EMERGENCY DEPT VISIT LOW MDM: CPT

## 2018-05-04 NOTE — PD
HPI


Chief Complaint:   Complaint


Time Seen by Provider:  23:23


Travel History


International Travel<30 days:  No


Contact w/Intl Traveler<30days:  No


Traveled to known affect area:  No





History of Present Illness


HPI


Patient is a 42-year-old female presenting to the emergency department for 

evaluation of left groin pain.  Patient states this started earlier this 

afternoon.  She denies any injury or trauma.  She states the pain radiates down 

into her "private parts".  She states it burns when she urinates as well.  This 

also started today.  She denies any abdominal pain, nausea, vomiting, fever, 

chills.  Pain is worse on palpation.  Symptom onset was sudden, symptoms are 

moderate in nature.  There are no alleviating factors.  She reports pain is a 5 

out of 10.  She has no other complaints at this time.





PFSH


Past Medical History


ADHD:  Yes


Anxiety:  Yes


Depression:  Yes


Gastrointestinal Disorders:  Yes


GERD:  Yes


Hypertension:  Yes


Musculoskeletal:  Yes (CHRONIC BACK PAIN)


Psychiatric:  Yes


Reproductive:  Yes


Immunizations Current:  Yes


Migraines:  Yes


Seizures:  Yes


Ulcer:  Yes


Tetanus Vaccination:  < 5 Years


Influenza Vaccination:  No


Pregnant?:  Not Pregnant


Menopausal:  Yes


:  4


Para:  3


Miscarriage:  1


:  0


Ovarian Cysts:  Yes


Dilation and Curettage (D&C):  Yes


Tubal Ligation:  Yes





Past Surgical History


Abdominal Surgery:  Yes (UMBILICAL HERNIA REPAIR)


Body Medical Devices:  UMBILICAL HERNIA


Gynecologic Surgery:  Yes


Hysterectomy:  Yes


Other Surgery:  Yes (umbilical hernia )





Social History


Alcohol Use:  No ('SOMETIMES")


Tobacco Use:  No


Substance Use:  No





Allergies-Medications


(Allergen,Severity, Reaction):  


Coded Allergies:  


     acetaminophen (Unverified  Adverse Reaction, Intermediate, Nausea/Vomiting

, 18)


     hydrocodone (Unverified  Adverse Reaction, Intermediate, Nausea/Vomiting, )


     tramadol (Unverified  Adverse Reaction, Intermediate, Nausea/Vomiting, )


Reported Meds & Prescriptions





Reported Meds & Active Scripts


Active


Prilosec (Omeprazole Magnesium) 20 Mg Tab 1 Tab PO DAILY


Reported


Amlodipine (Amlodipine Besylate) 5 Mg Tab 5 Mg PO DAILY








Review of Systems


Except as stated in HPI:  all other systems reviewed are Neg


Genitourinary:  Positive: Dysuria


Musculoskeletal:  Positive: Myalgias, Pain





Physical Exam


Narrative


GENERAL: Well-developed, well-nourished, alert -American female.  

Presenting in no acute distress.


SKIN: Warm and dry.


HEAD: Normocephalic.


EYES: No scleral icterus. No injection or drainage. 


NECK: Supple, trachea midline. No JVD or lymphadenopathy.


CARDIOVASCULAR: Regular rate and rhythm without murmurs, gallops, or rubs. 


RESPIRATORY: Breath sounds equal bilaterally. No accessory muscle use.


GASTROINTESTINAL: Abdomen soft, non-tender, nondistended.  Tenderness to 

palpation left groin/left pubic bone.


MUSCULOSKELETAL: No cyanosis, or edema. 


BACK: Nontender without obvious deformity. No CVA tenderness.








Data


Data


Last Documented VS





Vital Signs








  Date Time  Temp Pulse Resp B/P (MAP) Pulse Ox O2 Delivery O2 Flow Rate FiO2


 


18 22:18 98.0 92 16 115/71 (86) 100   








Orders





 Orders


Urinalysis - C+S If Indicated (18 23:23)


Ibuprofen (Motrin) (18 23:45)


Cyclobenzaprine (Flexeril) (18 23:45)





Labs





Laboratory Tests








Test


  18


00:07


 


Urine Color YELLOW 


 


Urine Turbidity CLEAR 


 


Urine pH 6.0 


 


Urine Specific Gravity 1.029 


 


Urine Protein NEG mg/dL 


 


Urine Glucose (UA) NEG mg/dL 


 


Urine Ketones NEG mg/dL 


 


Urine Occult Blood NEG 


 


Urine Nitrite NEG 


 


Urine Bilirubin NEG 


 


Urine Urobilinogen 1.0 MG/DL 


 


Urine Leukocyte Esterase NEG 


 


Urine RBC 1 /hpf 


 


Urine WBC 2 /hpf 


 


Urine Squamous Epithelial


Cells 2 /hpf 


 


 


Microscopic Urinalysis Comment


  CULT NOT


INDICATED











MDM


Medical Decision Making


Medical Screen Exam Complete:  Yes


Emergency Medical Condition:  Yes


Interpretation(s)





Laboratory Tests








Test


  18


00:07


 


Urine Color YELLOW 


 


Urine Turbidity CLEAR 


 


Urine pH 6.0 


 


Urine Specific Gravity 1.029 


 


Urine Protein NEG mg/dL 


 


Urine Glucose (UA) NEG mg/dL 


 


Urine Ketones NEG mg/dL 


 


Urine Occult Blood NEG 


 


Urine Nitrite NEG 


 


Urine Bilirubin NEG 


 


Urine Urobilinogen 1.0 MG/DL 


 


Urine Leukocyte Esterase NEG 


 


Urine RBC 1 /hpf 


 


Urine WBC 2 /hpf 


 


Urine Squamous Epithelial


Cells 2 /hpf 


 


 


Microscopic Urinalysis Comment


  CULT NOT


INDICATED








Vital Signs








  Date Time  Temp Pulse Resp B/P (MAP) Pulse Ox O2 Delivery O2 Flow Rate FiO2


 


18 22:18 98.0 92 16 115/71 (86) 100   








Differential Diagnosis


Groin strain versus muscle spasm versus UTI versus other


Narrative Course


Patient is well-appearing 42-year-old female presenting with a few hours of 

left groin pain radiating into her genitals.  Abdominal exam is benign.  

Urinalysis ordered and pending.  Patient's vital signs are stable, she is well-

appearing.  Urinalysis unremarkable.  Patient reports improvement in her pain 

after administration of medications.  Patient will be given prescriptions to 

continue use at home.  She is encouraged to follow-up with her primary doctor 

or return to emergency department for any new or worsening symptoms.  Patient 

is stable for discharge.





Diagnosis





 Primary Impression:  


 Groin strain


 Qualified Codes:  S76.212A - Strain of adductor muscle, fascia and tendon of 

left thigh, initial encounter


Referrals:  


[facility]


Patient Instructions:  General Instructions, Groin Strain (ED)





***Additional Instructions:  


Follow-up with your primary doctor


Continue range of motion exercises


Apply warm heat to the affected area


Take medications as directed


Return to emergency department for any new worsening symptoms


***Med/Other Pt SpecificInfo:  Prescription(s) given


Scripts


Ibuprofen (Ibuprofen) 800 Mg Tab


800 MG PO Q6HR Y for PAIN, #40 TAB 0 Refills


   Prov: Marya Hurt         18 


Cyclobenzaprine (Flexeril) 10 Mg Tab


10 MG PO TID Y for MUSCLE SPASM, #30 TAB 0 Refills


   Prov: Marya Hurt         18


Disposition:  01 DISCHARGE HOME


Condition:  Stable











Marya Hurt May 4, 2018 23:43

## 2018-05-05 LAB
COLOR UR: YELLOW
GLUCOSE UR STRIP-MCNC: (no result) MG/DL
HGB UR QL STRIP: (no result)
KETONES UR STRIP-MCNC: (no result) MG/DL
NITRITE UR QL STRIP: (no result)
SP GR UR STRIP: 1.03 (ref 1–1.03)
SQUAMOUS #/AREA URNS HPF: 2 /HPF (ref 0–5)
URINE LEUKOCYTE ESTERASE: (no result)

## 2018-05-05 NOTE — PD
Data


Data


Last Documented VS





Vital Signs








  Date Time  Temp Pulse Resp B/P (MAP) Pulse Ox O2 Delivery O2 Flow Rate FiO2


 


5/4/18 22:18 98.0 92 16 115/71 (86) 100   








Orders





 Orders


Urinalysis - C+S If Indicated (5/4/18 23:23)


Ibuprofen (Motrin) (5/4/18 23:45)


Cyclobenzaprine (Flexeril) (5/4/18 23:45)





Labs





Laboratory Tests








Test


  5/5/18


00:07


 


Urine Color YELLOW 


 


Urine Turbidity CLEAR 


 


Urine pH 6.0 


 


Urine Protein NEG mg/dL 


 


Urine Glucose (UA) NEG mg/dL 


 


Urine Ketones NEG mg/dL 


 


Urine Occult Blood NEG 


 


Urine Nitrite NEG 


 


Urine Bilirubin NEG 


 


Urine Urobilinogen 1.0 MG/DL 


 


Urine Leukocyte Esterase NEG 











MDM


Supervised Visit with SILVANO:  Yes


Narrative Course


The history, exam, and medical decision-making in the associated midlevel 

provider note were completed with my assistance. I reviewed and agree with the 

findings presented.  I attest that I had a face-to-face encounter with the 

patient on the same day, and personally performed and documented my assessment 

and findings in the medical record. 





*My assessment and Findings: This is a 42-year-old female who presents to the 

emergency department with groin pain on the left side that radiates into her 

vagina associated with some mild dysuria.  She has a benign exam.  I suspect 

this is musculoskeletal.  Urinalysis will be obtained if negative patient can 

be discharged home on anti-inflammatories.











Malia Padron MD May 5, 2018 00:31

## 2018-05-19 ENCOUNTER — HOSPITAL ENCOUNTER (EMERGENCY)
Dept: HOSPITAL 17 - NEPD | Age: 43
Discharge: LEFT BEFORE DISCHARGE | End: 2018-05-19
Payer: SELF-PAY

## 2018-05-19 VITALS
HEART RATE: 99 BPM | TEMPERATURE: 98.5 F | RESPIRATION RATE: 16 BRPM | SYSTOLIC BLOOD PRESSURE: 112 MMHG | OXYGEN SATURATION: 99 % | DIASTOLIC BLOOD PRESSURE: 73 MMHG

## 2018-05-19 VITALS — WEIGHT: 165.35 LBS | BODY MASS INDEX: 25.95 KG/M2 | HEIGHT: 67 IN

## 2018-05-19 DIAGNOSIS — M54.5: ICD-10-CM

## 2018-05-19 DIAGNOSIS — M54.2: Primary | ICD-10-CM

## 2018-05-19 PROCEDURE — 99281 EMR DPT VST MAYX REQ PHY/QHP: CPT
